# Patient Record
Sex: FEMALE | Race: WHITE | NOT HISPANIC OR LATINO | Employment: FULL TIME | URBAN - METROPOLITAN AREA
[De-identification: names, ages, dates, MRNs, and addresses within clinical notes are randomized per-mention and may not be internally consistent; named-entity substitution may affect disease eponyms.]

---

## 2017-01-04 ENCOUNTER — ALLSCRIPTS OFFICE VISIT (OUTPATIENT)
Dept: OTHER | Facility: OTHER | Age: 52
End: 2017-01-04

## 2017-01-28 ENCOUNTER — ALLSCRIPTS OFFICE VISIT (OUTPATIENT)
Dept: OTHER | Facility: OTHER | Age: 52
End: 2017-01-28

## 2017-02-23 ENCOUNTER — ALLSCRIPTS OFFICE VISIT (OUTPATIENT)
Dept: OTHER | Facility: OTHER | Age: 52
End: 2017-02-23

## 2017-04-24 ENCOUNTER — ALLSCRIPTS OFFICE VISIT (OUTPATIENT)
Dept: OTHER | Facility: OTHER | Age: 52
End: 2017-04-24

## 2017-05-10 ENCOUNTER — ALLSCRIPTS OFFICE VISIT (OUTPATIENT)
Dept: OTHER | Facility: OTHER | Age: 52
End: 2017-05-10

## 2018-01-12 VITALS
HEIGHT: 63 IN | WEIGHT: 163 LBS | BODY MASS INDEX: 28.88 KG/M2 | HEART RATE: 74 BPM | RESPIRATION RATE: 14 BRPM | SYSTOLIC BLOOD PRESSURE: 138 MMHG | DIASTOLIC BLOOD PRESSURE: 80 MMHG | TEMPERATURE: 98.2 F

## 2018-01-14 VITALS
BODY MASS INDEX: 29.77 KG/M2 | HEART RATE: 78 BPM | RESPIRATION RATE: 16 BRPM | SYSTOLIC BLOOD PRESSURE: 118 MMHG | WEIGHT: 168 LBS | HEIGHT: 63 IN | DIASTOLIC BLOOD PRESSURE: 80 MMHG

## 2018-01-14 VITALS
TEMPERATURE: 97.6 F | BODY MASS INDEX: 29.52 KG/M2 | RESPIRATION RATE: 16 BRPM | WEIGHT: 164 LBS | DIASTOLIC BLOOD PRESSURE: 70 MMHG | SYSTOLIC BLOOD PRESSURE: 110 MMHG | HEART RATE: 76 BPM

## 2018-01-14 VITALS
HEART RATE: 82 BPM | BODY MASS INDEX: 28.7 KG/M2 | RESPIRATION RATE: 16 BRPM | SYSTOLIC BLOOD PRESSURE: 120 MMHG | TEMPERATURE: 97.6 F | HEIGHT: 63 IN | WEIGHT: 162 LBS | DIASTOLIC BLOOD PRESSURE: 72 MMHG

## 2018-01-14 VITALS
RESPIRATION RATE: 20 BRPM | WEIGHT: 162 LBS | BODY MASS INDEX: 28.7 KG/M2 | HEIGHT: 63 IN | HEART RATE: 72 BPM | DIASTOLIC BLOOD PRESSURE: 70 MMHG | SYSTOLIC BLOOD PRESSURE: 104 MMHG | TEMPERATURE: 97.3 F

## 2019-07-09 ENCOUNTER — TELEPHONE (OUTPATIENT)
Dept: FAMILY MEDICINE CLINIC | Facility: CLINIC | Age: 54
End: 2019-07-09

## 2020-02-03 ENCOUNTER — TELEPHONE (OUTPATIENT)
Dept: FAMILY MEDICINE CLINIC | Facility: CLINIC | Age: 55
End: 2020-02-03

## 2020-02-03 NOTE — TELEPHONE ENCOUNTER
----- Message from Jann Bowie sent at 2/3/2020  8:14 AM EST -----      ----- Message -----  From: Edwin Bell  Sent: 2/3/2020   8:14 AM EST  To: [facility]

## 2020-10-28 ENCOUNTER — TELEMEDICINE (OUTPATIENT)
Dept: FAMILY MEDICINE CLINIC | Facility: CLINIC | Age: 55
End: 2020-10-28
Payer: COMMERCIAL

## 2020-10-28 VITALS — BODY MASS INDEX: 25.16 KG/M2 | HEIGHT: 63 IN | WEIGHT: 142 LBS

## 2020-10-28 DIAGNOSIS — Z12.31 ENCOUNTER FOR SCREENING MAMMOGRAM FOR MALIGNANT NEOPLASM OF BREAST: ICD-10-CM

## 2020-10-28 DIAGNOSIS — J01.90 ACUTE SINUSITIS, RECURRENCE NOT SPECIFIED, UNSPECIFIED LOCATION: ICD-10-CM

## 2020-10-28 DIAGNOSIS — Z12.11 SCREENING FOR COLON CANCER: ICD-10-CM

## 2020-10-28 PROCEDURE — 99213 OFFICE O/P EST LOW 20 MIN: CPT | Performed by: FAMILY MEDICINE

## 2020-10-28 RX ORDER — ALBUTEROL SULFATE 90 UG/1
1 AEROSOL, METERED RESPIRATORY (INHALATION) EVERY 4 HOURS PRN
COMMUNITY
Start: 2017-05-10 | End: 2020-10-28 | Stop reason: SDUPTHER

## 2020-10-28 RX ORDER — FEXOFENADINE HCL 180 MG/1
180 TABLET ORAL DAILY
COMMUNITY

## 2020-10-28 RX ORDER — AZITHROMYCIN 250 MG/1
TABLET, FILM COATED ORAL
Qty: 6 TABLET | Refills: 0 | Status: SHIPPED | OUTPATIENT
Start: 2020-10-28 | End: 2020-11-02

## 2020-10-28 RX ORDER — FAMOTIDINE, CALCIUM CARBONATE, AND MAGNESIUM HYDROXIDE 10; 800; 165 MG/1; MG/1; MG/1
TABLET, CHEWABLE ORAL
COMMUNITY

## 2020-10-28 RX ORDER — ALBUTEROL SULFATE 90 UG/1
1 AEROSOL, METERED RESPIRATORY (INHALATION) EVERY 4 HOURS PRN
Qty: 18 G | Refills: 1 | Status: SHIPPED | OUTPATIENT
Start: 2020-10-28 | End: 2022-01-28

## 2020-11-16 ENCOUNTER — TELEMEDICINE (OUTPATIENT)
Dept: FAMILY MEDICINE CLINIC | Facility: CLINIC | Age: 55
End: 2020-11-16
Payer: COMMERCIAL

## 2020-11-16 VITALS — HEIGHT: 63 IN | TEMPERATURE: 100.4 F | BODY MASS INDEX: 25.16 KG/M2 | WEIGHT: 142 LBS

## 2020-11-16 DIAGNOSIS — B34.9 VIRAL INFECTION, UNSPECIFIED: ICD-10-CM

## 2020-11-16 DIAGNOSIS — J06.9 ACUTE URI: Primary | ICD-10-CM

## 2020-11-16 PROCEDURE — 99213 OFFICE O/P EST LOW 20 MIN: CPT | Performed by: FAMILY MEDICINE

## 2020-11-16 PROCEDURE — 4004F PT TOBACCO SCREEN RCVD TLK: CPT | Performed by: FAMILY MEDICINE

## 2020-11-16 PROCEDURE — 87637 SARSCOV2&INF A&B&RSV AMP PRB: CPT | Performed by: FAMILY MEDICINE

## 2020-11-16 PROCEDURE — 3008F BODY MASS INDEX DOCD: CPT | Performed by: FAMILY MEDICINE

## 2020-11-16 PROCEDURE — 3725F SCREEN DEPRESSION PERFORMED: CPT | Performed by: FAMILY MEDICINE

## 2020-11-16 RX ORDER — AZITHROMYCIN 250 MG/1
TABLET, FILM COATED ORAL
Qty: 6 TABLET | Refills: 0 | Status: SHIPPED | OUTPATIENT
Start: 2020-11-16 | End: 2020-11-21

## 2020-11-19 ENCOUNTER — TELEPHONE (OUTPATIENT)
Dept: FAMILY MEDICINE CLINIC | Facility: CLINIC | Age: 55
End: 2020-11-19

## 2020-11-20 ENCOUNTER — TELEPHONE (OUTPATIENT)
Dept: FAMILY MEDICINE CLINIC | Facility: CLINIC | Age: 55
End: 2020-11-20

## 2020-11-20 LAB
FLUAV RNA NPH QL NAA+PROBE: NOT DETECTED
FLUBV RNA NPH QL NAA+PROBE: NOT DETECTED
RSV RNA NPH QL NAA+PROBE: NOT DETECTED
SARS-COV-2 RNA NPH QL NAA+PROBE: NOT DETECTED

## 2020-11-30 ENCOUNTER — TELEPHONE (OUTPATIENT)
Dept: GASTROENTEROLOGY | Facility: CLINIC | Age: 55
End: 2020-11-30

## 2021-03-30 ENCOUNTER — TELEPHONE (OUTPATIENT)
Dept: FAMILY MEDICINE CLINIC | Facility: CLINIC | Age: 56
End: 2021-03-30

## 2021-03-30 ENCOUNTER — TELEMEDICINE (OUTPATIENT)
Dept: FAMILY MEDICINE CLINIC | Facility: CLINIC | Age: 56
End: 2021-03-30

## 2021-03-30 DIAGNOSIS — J01.90 ACUTE SINUSITIS, RECURRENCE NOT SPECIFIED, UNSPECIFIED LOCATION: Primary | ICD-10-CM

## 2021-03-30 PROCEDURE — 99213 OFFICE O/P EST LOW 20 MIN: CPT | Performed by: NURSE PRACTITIONER

## 2021-03-30 RX ORDER — AZITHROMYCIN 250 MG/1
TABLET, FILM COATED ORAL
Qty: 6 TABLET | Refills: 0 | Status: SHIPPED | OUTPATIENT
Start: 2021-03-30 | End: 2021-04-04

## 2021-03-30 NOTE — PROGRESS NOTES
Virtual Regular Visit      Assessment/Plan:    Problem List Items Addressed This Visit     None      Visit Diagnoses     Acute sinusitis, recurrence not specified, unspecified location    -  Primary    Relevant Medications    azithromycin (ZITHROMAX) 250 mg tablet        Take medication with food  It is important that you take the entire course of antibiotics prescribed  May also take a probiotic of your choice to maintain healthy GI yesenia  Can take some probiotic and yogurt with the medication  Supportive care discussed and advised  Advised to RTO for any worsening and no improvement  Follow up for no improvement and worsening of conditions  Patient advised and educated when to see immediate medical care  Reason for visit is   Chief Complaint   Patient presents with    Virtual Regular Visit        Encounter provider REGI Jones    Provider located at [address] [address]      Recent Visits  No visits were found meeting these conditions  Showing recent visits within past 7 days and meeting all other requirements     Today's Visits  Date Type Provider Dept   03/30/21 Telemedicine [place] today's visits and meeting all other requirements     Future Appointments  No visits were found meeting these conditions  Showing future appointments within next 150 days and meeting all other requirements        The patient was identified by name and date of birth  Roni Savage was informed that this is a telemedicine visit and that the visit is being conducted through [facility] and patient was informed that this is a secure, HIPAA-compliant platform  She agrees to proceed     My office door was closed  No one else was in the room  She acknowledged consent and understanding of privacy and security of the video platform   The patient has agreed to participate and understands they can discontinue the visit at any time  Patient is aware this is a billable service  Subjective  Jay Jay Lorenz is a 63 y o  female    HPI   Patient stated that started with congestion, sneezing and bodyaches on 03/26/2021 and got tested at [facility] for COVID-19 which came back negative and got little better but now today symptoms got worse with ear pain, sinus pressure and has temp of 100 2  Denies any chills, sob, cough,  fatigue, headache, new loss of sense of smell and taste, sore throat,  nausea, vomiting and diarrhea  Past Medical History:   Diagnosis Date    Allergic rhinitis     Resolved 2/23/2017     Chronic sinusitis     Resolved 1/4/2017     GERD (gastroesophageal reflux disease)     Mitral valve prolapse     Palpitations     Last assessed 9/13/2006     Skin disorder     Resolved 1/4/2017     Skin lesions     Resolved 1/4/2017        Past Surgical History:   Procedure Laterality Date    ENDOMETRIAL ABLATION      LIPOMA RESECTION      arm    TUBAL LIGATION         Current Outpatient Medications   Medication Sig Dispense Refill    albuterol (ProAir HFA) 90 mcg/act inhaler Inhale 1 puff every 4 (four) hours as needed for wheezing or shortness of breath 18 g 1    azithromycin (ZITHROMAX) 250 mg tablet Take 500mg on day 1, 250mg on days 2-5 6 tablet 0    famotidine-calcium carbonate-magnesium hydroxide (Pepcid Complete) -165 MG CHEW Chew      fexofenadine (ALLEGRA) 180 MG tablet Take 180 mg by mouth daily       No current facility-administered medications for this visit  Allergies   Allergen Reactions    Diphenhydramine      Reaction Date: 18Apr2005;     Penicillins      Reaction Date: 18Apr2005; Review of Systems   Constitutional: Positive for fever (100 2)  Negative for chills, diaphoresis, fatigue and unexpected weight change  HENT: Positive for congestion, ear pain, sinus pressure and sneezing   Negative for dental problem, drooling, ear discharge, facial swelling, hearing loss, mouth sores, nosebleeds, postnasal drip, rhinorrhea, sinus pain, sore throat, tinnitus, trouble swallowing and voice change  Respiratory: Negative for cough, chest tightness, shortness of breath and wheezing  Cardiovascular: Negative  Gastrointestinal: Negative for abdominal pain, constipation, diarrhea, nausea and vomiting  Musculoskeletal: Positive for myalgias  Skin: Negative  Neurological: Negative for dizziness, light-headedness and headaches  Video Exam    There were no vitals filed for this visit  Physical Exam  Constitutional:       Appearance: She is well-developed  HENT:      Nose: Nose normal    Eyes:      Conjunctiva/sclera: Conjunctivae normal    Neck:      Musculoskeletal: Normal range of motion  Pulmonary:      Effort: Pulmonary effort is normal       Comments: No cough and distress noted on video call  Skin:     Comments: No diaphoresis noted on video call   Neurological:      Mental Status: She is alert and oriented to person, place, and time  Psychiatric:         Mood and Affect: Mood normal          Behavior: Behavior normal          Thought Content: Thought content normal          Judgment: Judgment normal           I spent 7 minutes directly with the patient during this visit      VIRTUAL VISIT DISCLAIMER    Theresa Toro acknowledges that she has consented to an online visit or consultation  She understands that the online visit is based solely on information provided by her, and that, in the absence of a face-to-face physical evaluation by the physician, the diagnosis she receives is both limited and provisional in terms of accuracy and completeness  This is not intended to replace a full medical face-to-face evaluation by the physician  Theresa Toro understands and accepts these terms

## 2021-03-30 NOTE — LETTER
March 30, 2021     Patient: Jaylin Hua   YOB: 1965   Date of Visit: 3/30/2021       To Whom it May Concern:    Jaylin Hua is under my professional care  She was seen in my office on 3/30/2021  Please excuse from work on 03/30/2021 and 03/31/2021  If you have any questions or concerns, please don't hesitate to call  Sincerely,        REGI Adrian            CC:  No Recipients

## 2022-01-20 ENCOUNTER — HOSPITAL ENCOUNTER (EMERGENCY)
Facility: HOSPITAL | Age: 57
Discharge: HOME | End: 2022-01-21
Attending: EMERGENCY MEDICINE | Admitting: EMERGENCY MEDICINE
Payer: COMMERCIAL

## 2022-01-20 DIAGNOSIS — R07.81 RIB PAIN ON RIGHT SIDE: Primary | ICD-10-CM

## 2022-01-20 DIAGNOSIS — J18.9 PNEUMONITIS: ICD-10-CM

## 2022-01-20 PROCEDURE — 99284 EMERGENCY DEPT VISIT MOD MDM: CPT

## 2022-01-21 ENCOUNTER — APPOINTMENT (EMERGENCY)
Dept: RADIOLOGY | Facility: HOSPITAL | Age: 57
End: 2022-01-21
Payer: COMMERCIAL

## 2022-01-21 VITALS
BODY MASS INDEX: 30.11 KG/M2 | DIASTOLIC BLOOD PRESSURE: 70 MMHG | OXYGEN SATURATION: 98 % | SYSTOLIC BLOOD PRESSURE: 140 MMHG | RESPIRATION RATE: 18 BRPM | TEMPERATURE: 98.7 F | WEIGHT: 170 LBS | HEART RATE: 62 BPM

## 2022-01-21 LAB
ALBUMIN SERPL BCP-MCNC: 3.3 G/DL (ref 3.5–5)
ALP SERPL-CCNC: 80 U/L (ref 46–116)
ALT SERPL W P-5'-P-CCNC: 28 U/L (ref 12–78)
ANION GAP SERPL CALCULATED.3IONS-SCNC: 12 MMOL/L (ref 4–13)
AST SERPL W P-5'-P-CCNC: 12 U/L (ref 5–45)
ATRIAL RATE: 67 BPM
BASOPHILS # BLD AUTO: 0.05 THOUSANDS/ΜL (ref 0–0.1)
BASOPHILS NFR BLD AUTO: 1 % (ref 0–1)
BILIRUB SERPL-MCNC: 0.25 MG/DL (ref 0.2–1)
BUN SERPL-MCNC: 17 MG/DL (ref 5–25)
CALCIUM ALBUM COR SERPL-MCNC: 9.9 MG/DL (ref 8.3–10.1)
CALCIUM SERPL-MCNC: 9.3 MG/DL (ref 8.3–10.1)
CARDIAC TROPONIN I PNL SERPL HS: 3 NG/L
CHLORIDE SERPL-SCNC: 100 MMOL/L (ref 100–108)
CO2 SERPL-SCNC: 26 MMOL/L (ref 21–32)
CREAT SERPL-MCNC: 0.89 MG/DL (ref 0.6–1.3)
D DIMER PPP FEU-MCNC: 0.33 UG/ML FEU
EOSINOPHIL # BLD AUTO: 0.21 THOUSAND/ΜL (ref 0–0.61)
EOSINOPHIL NFR BLD AUTO: 3 % (ref 0–6)
ERYTHROCYTE [DISTWIDTH] IN BLOOD BY AUTOMATED COUNT: 13.2 % (ref 11.6–15.1)
GFR SERPL CREATININE-BSD FRML MDRD: 72 ML/MIN/1.73SQ M
GLUCOSE SERPL-MCNC: 115 MG/DL (ref 65–140)
HCT VFR BLD AUTO: 40.5 % (ref 34.8–46.1)
HGB BLD-MCNC: 12.7 G/DL (ref 11.5–15.4)
IMM GRANULOCYTES # BLD AUTO: 0.04 THOUSAND/UL (ref 0–0.2)
IMM GRANULOCYTES NFR BLD AUTO: 1 % (ref 0–2)
LYMPHOCYTES # BLD AUTO: 2.73 THOUSANDS/ΜL (ref 0.6–4.47)
LYMPHOCYTES NFR BLD AUTO: 35 % (ref 14–44)
MCH RBC QN AUTO: 28.9 PG (ref 26.8–34.3)
MCHC RBC AUTO-ENTMCNC: 31.4 G/DL (ref 31.4–37.4)
MCV RBC AUTO: 92 FL (ref 82–98)
MONOCYTES # BLD AUTO: 0.79 THOUSAND/ΜL (ref 0.17–1.22)
MONOCYTES NFR BLD AUTO: 10 % (ref 4–12)
NEUTROPHILS # BLD AUTO: 3.92 THOUSANDS/ΜL (ref 1.85–7.62)
NEUTS SEG NFR BLD AUTO: 50 % (ref 43–75)
NRBC BLD AUTO-RTO: 0 /100 WBCS
P AXIS: 69 DEGREES
PLATELET # BLD AUTO: 219 THOUSANDS/UL (ref 149–390)
PMV BLD AUTO: 10.7 FL (ref 8.9–12.7)
POTASSIUM SERPL-SCNC: 3.8 MMOL/L (ref 3.5–5.3)
PR INTERVAL: 190 MS
PROT SERPL-MCNC: 6.8 G/DL (ref 6.4–8.2)
QRS AXIS: 6 DEGREES
QRSD INTERVAL: 84 MS
QT INTERVAL: 374 MS
QTC INTERVAL: 395 MS
RBC # BLD AUTO: 4.39 MILLION/UL (ref 3.81–5.12)
SODIUM SERPL-SCNC: 138 MMOL/L (ref 136–145)
T WAVE AXIS: 71 DEGREES
VENTRICULAR RATE: 67 BPM
WBC # BLD AUTO: 7.74 THOUSAND/UL (ref 4.31–10.16)

## 2022-01-21 PROCEDURE — 85379 FIBRIN DEGRADATION QUANT: CPT | Performed by: EMERGENCY MEDICINE

## 2022-01-21 PROCEDURE — 36415 COLL VENOUS BLD VENIPUNCTURE: CPT | Performed by: EMERGENCY MEDICINE

## 2022-01-21 PROCEDURE — 71045 X-RAY EXAM CHEST 1 VIEW: CPT

## 2022-01-21 PROCEDURE — 84484 ASSAY OF TROPONIN QUANT: CPT | Performed by: EMERGENCY MEDICINE

## 2022-01-21 PROCEDURE — 93005 ELECTROCARDIOGRAM TRACING: CPT

## 2022-01-21 PROCEDURE — 85025 COMPLETE CBC W/AUTO DIFF WBC: CPT | Performed by: EMERGENCY MEDICINE

## 2022-01-21 PROCEDURE — 93010 ELECTROCARDIOGRAM REPORT: CPT | Performed by: INTERNAL MEDICINE

## 2022-01-21 PROCEDURE — 96374 THER/PROPH/DIAG INJ IV PUSH: CPT

## 2022-01-21 PROCEDURE — 80053 COMPREHEN METABOLIC PANEL: CPT | Performed by: EMERGENCY MEDICINE

## 2022-01-21 PROCEDURE — 99285 EMERGENCY DEPT VISIT HI MDM: CPT | Performed by: EMERGENCY MEDICINE

## 2022-01-21 RX ORDER — NAPROXEN 500 MG/1
500 TABLET ORAL 2 TIMES DAILY WITH MEALS
Qty: 30 TABLET | Refills: 0 | Status: SHIPPED | OUTPATIENT
Start: 2022-01-21 | End: 2022-07-05

## 2022-01-21 RX ORDER — LIDOCAINE 50 MG/G
1 PATCH TOPICAL DAILY
Qty: 5 PATCH | Refills: 0 | Status: SHIPPED | OUTPATIENT
Start: 2022-01-21 | End: 2022-02-16 | Stop reason: ALTCHOICE

## 2022-01-21 RX ORDER — KETOROLAC TROMETHAMINE 30 MG/ML
15 INJECTION, SOLUTION INTRAMUSCULAR; INTRAVENOUS ONCE
Status: COMPLETED | OUTPATIENT
Start: 2022-01-21 | End: 2022-01-21

## 2022-01-21 RX ORDER — CYCLOBENZAPRINE HCL 10 MG
10 TABLET ORAL 2 TIMES DAILY PRN
Qty: 20 TABLET | Refills: 0 | Status: SHIPPED | OUTPATIENT
Start: 2022-01-21 | End: 2022-07-05

## 2022-01-21 RX ORDER — DIAZEPAM 5 MG/1
5 TABLET ORAL ONCE
Status: COMPLETED | OUTPATIENT
Start: 2022-01-21 | End: 2022-01-21

## 2022-01-21 RX ORDER — LIDOCAINE 50 MG/G
1 PATCH TOPICAL ONCE
Status: DISCONTINUED | OUTPATIENT
Start: 2022-01-21 | End: 2022-01-21 | Stop reason: HOSPADM

## 2022-01-21 RX ORDER — ACETAMINOPHEN 325 MG/1
650 TABLET ORAL ONCE
Status: COMPLETED | OUTPATIENT
Start: 2022-01-21 | End: 2022-01-21

## 2022-01-21 RX ADMIN — ACETAMINOPHEN 650 MG: 325 TABLET, FILM COATED ORAL at 01:05

## 2022-01-21 RX ADMIN — KETOROLAC TROMETHAMINE 15 MG: 30 INJECTION, SOLUTION INTRAMUSCULAR at 01:05

## 2022-01-21 RX ADMIN — DIAZEPAM 5 MG: 5 TABLET ORAL at 01:05

## 2022-01-21 RX ADMIN — LIDOCAINE 5% 1 PATCH: 700 PATCH TOPICAL at 01:04

## 2022-01-21 NOTE — ED PROVIDER NOTES
Final Diagnosis:  1  Rib pain on right side    2  Pneumonitis        Chief Complaint   Patient presents with    Rib Pain     R rib pain started today and worsening, Denies any SOB, nausea, no injury to area  HPI  Patient presents with right chest wall pain and dyspnea  She has been more active lately so conceivable msk  Minimal pain at rest  No prior clots  No prior cardiac  No recent travel stasis cancer  Symptoms today  Not entirely reproducible with palpation  Not diaphoretic  No assoc n/v/radiation/diaphoresis  No productive cough, all dry  Comfortable breathing on RA>     H/o  MVP, emphysema (empyema false entry), 10d out from covid, no fevers, coughing mucous, dry cough  - No language barrier    - History obtained from patient  - There are no limitations to the history obtained  - Previous charting underwent limited review with attention to last ED visits, labs, ekgs, and prior imaging  PMH:   has a past medical history of Allergic rhinitis, Chronic sinusitis, COVID-19 (01/10/2022), Empyema ([address] ), GERD (gastroesophageal reflux disease), Mitral valve prolapse, Palpitations, Skin disorder, and Skin lesions  PSH:   has a past surgical history that includes Endometrial ablation; Tubal ligation; and Lipoma resection  Social History:  No smoking    ROS:  Review of Systems   Constitutional: Negative for chills and fever  HENT: Negative for ear pain and sore throat  Eyes: Negative for pain and visual disturbance  Respiratory: Positive for cough and chest tightness  Negative for shortness of breath  Cardiovascular: Positive for chest pain  Negative for palpitations  Gastrointestinal: Negative for abdominal pain and vomiting  Genitourinary: Negative for dysuria and hematuria  Musculoskeletal: Negative for arthralgias and back pain  Skin: Negative for color change and rash  Neurological: Negative for seizures and syncope  All other systems reviewed and are negative         PE: Physical exam highlights:   Physical Exam       Vitals:    01/20/22 2313 01/21/22 0115   BP: 144/72 140/70   BP Location: Right arm Right arm   Pulse: 81 62   Resp: 18 18   Temp: 98 7 °F (37 1 °C)    TempSrc: Tympanic    SpO2: 96% 98%   Weight: 77 1 kg (170 lb)      Vitals reviewed by me  Nursing note reviewed  Chaperone present for all sensitive exam   Const: No acute distress  Alert  Nontoxic  Not diaphoretic  HEENT: External ears normal  No protrusion drainage swelling  Nose normal  No drainage/traumatic deformity  MMM  Mouth with baseline/symmetric movement  No trismus  Eyes: No squinting  No icterus  Tracks through the room with normal EOM  No tearing/swelling/drainage  Neck: ROM normal  No rigidity  No meningismus  Cards: Rate as per vitals  Compared to monitor sinus unless documented above  Regular  Well perfused  Pulm: able to verbalize without additional effort  Effort and excursion normal  No disress  No audible wheezing/ stridor  Normal resp rate  Abd: No distension beyond baseline  No fluctuant wave  Patient without peritoneal pain with shifting/bumping the bed  MSK: ROM normal and baseline  No deformity  Skin: No new rashes visible  Well perfused  Neuro: Nonfocal  Baseline  CN grossly intact  Moving all four with coordination  Psych: Normal behavior and affect  A:  - Nursing note reviewed      Ddx and MDM  PE -dimer  Single trop ACS (greater than 3 hour)    EKG    Pneumonitis  pneumo thorax - xr  Pneumonia post covid - xr, lab        HEART Risk Score      Most Recent Value   Heart Score Risk Calculator    History 0 Filed at: 01/23/2022 0003   ECG 1 Filed at: 01/23/2022 0003   Age 1 Filed at: 01/23/2022 0003   Risk Factors 1 Filed at: 01/23/2022 0003   Troponin 0 Filed at: 01/23/2022 0003   HEART Score 3 Filed at: 01/23/2022 0003                     ED Course as of 01/23/22 0003   Fri Jan 21, 2022   0200 Procedure Note: EKG  Date/Time: 01/21/22 2:00 AM   Interpreted by: Jose Beasley Baby Smolder  Indications / Diagnosis: CP  ECG reviewed by me, the ED Provider: yes   The EKG demonstrates:  Rhythm: sinus    Intervals: normal intervals  Axis: normal axis  QRS/Blocks: normal QRS  ST Changes: No acute ST Changes, no STD/SHIRLEY  T wave changes: none  Nonspefic s1q3t(flat)3       XR chest 1 view portable   Final Result      No acute cardiopulmonary disease  Workstation performed: [id number]           Orders Placed This Encounter   Procedures    XR chest 1 view portable    Comprehensive metabolic panel    HS Troponin 0hr (reflex protocol)    CBC and differential    D-dimer, quantitative    EKG RESULTS    ECG 12 lead    ECG 12 lead     Labs Reviewed   COMPREHENSIVE METABOLIC PANEL - Abnormal       Result Value Ref Range Status    Sodium 138  136 - 145 mmol/L Final    Potassium 3 8  3 5 - 5 3 mmol/L Final    Chloride 100  100 - 108 mmol/L Final    CO2 26  21 - 32 mmol/L Final    ANION GAP 12  4 - 13 mmol/L Final    BUN 17  5 - 25 mg/dL Final    Creatinine 0 89  0 60 - 1 30 mg/dL Final    Comment: Standardized to IDMS reference method    Glucose 115  65 - 140 mg/dL Final    Comment: If the patient is fasting, the ADA then defines impaired fasting glucose as > 100 mg/dL and diabetes as > or equal to 123 mg/dL  Specimen collection should occur prior to Sulfasalazine administration due to the potential for falsely depressed results  Specimen collection should occur prior to Sulfapyridine administration due to the potential for falsely elevated results  Calcium 9 3  8 3 - 10 1 mg/dL Final    Corrected Calcium 9 9  8 3 - 10 1 mg/dL Final    AST 12  5 - 45 U/L Final    Comment: Specimen collection should occur prior to Sulfasalazine administration due to the potential for falsely depressed results  ALT 28  12 - 78 U/L Final    Comment: Specimen collection should occur prior to Sulfasalazine administration due to the potential for falsely depressed results       Alkaline Phosphatase 80 46 - 116 U/L Final    Total Protein 6 8  6 4 - 8 2 g/dL Final    Albumin 3 3 (*) 3 5 - 5 0 g/dL Final    Total Bilirubin 0 25  0 20 - 1 00 mg/dL Final    Comment: Use of this assay is not recommended for patients undergoing treatment with eltrombopag due to the potential for falsely elevated results  eGFR 72  ml/min/1 73sq m Final    Narrative:     [place] guidelines for Chronic Kidney Disease (CKD):     Stage 1 with normal or high GFR (GFR > 90 mL/min/1 73 square meters)    Stage 2 Mild CKD (GFR = 60-89 mL/min/1 73 square meters)    Stage 3A Moderate CKD (GFR = 45-59 mL/min/1 73 square meters)    Stage 3B Moderate CKD (GFR = 30-44 mL/min/1 73 square meters)    Stage 4 Severe CKD (GFR = 15-29 mL/min/1 73 square meters)    Stage 5 End Stage CKD (GFR <15 mL/min/1 73 square meters)  Note: GFR calculation is accurate only with a steady state creatinine   D-DIMER, QUANTITATIVE - Normal    D-Dimer, Quant 0 33  <0 50 ug/ml FEU Final    Comment: Reference and upper limits to exclude DVT and PE are the same  Do not use to exclude if clinical symptoms are present  Pregnant women:  1st trimester:  <0 22 - 1 06 ug/ml FEU  2nd trimester:  <0 22 - 1 88 ug/ml FEU  3rd trimester:   0 24 - 3 28 ug/ml FEU    Note: Normal ranges may not apply to patients who are transgender, non-binary, or whose legal sex, sex at birth, and gender identity differ  HS TROPONIN I 0HR    hs TnI 0hr 3  "Refer to ACS Flowchart"- see link ng/L Final    Comment:                                              Initial (time 0) result  If >=50 ng/L, Myocardial injury suggested ;  Type of myocardial injury and treatment strategy  to be determined  If 5-49 ng/L, a delta result at 2 hours and or 4 hours will be needed to further evaluate  If <4 ng/L, and chest pain has been >3 hours since onset, patient may qualify for discharge based on the HEART score in the ED    If <5 ng/L and <3hours since onset of chest pain, a delta result at 2 hours will be needed to further evaluate  Second Troponin (time 2 hours)  If calculated delta >= 20 ng/L,  Myocardial injury suggested ; Type of myocardial injury and treatment strategy to be determined  If 5-49 ng/L and the calculated delta is 5-19 ng/L, consult medical service for evaluation  Continue evaluation for ischemia on ecg and other possible etiology and repeat hs troponin at 4 hours  If delta is <5 ng/L at 2 hours, consider discharge based on risk stratification via the HEART score (if in ED), or GIANA risk score in IP/Observation  CBC AND DIFFERENTIAL    WBC 7 74  4 31 - 10 16 Thousand/uL Final    RBC 4 39  3 81 - 5 12 Million/uL Final    Hemoglobin 12 7  11 5 - 15 4 g/dL Final    Hematocrit 40 5  34 8 - 46 1 % Final    MCV 92  82 - 98 fL Final    MCH 28 9  26 8 - 34 3 pg Final    MCHC 31 4  31 4 - 37 4 g/dL Final    RDW 13 2  11 6 - 15 1 % Final    MPV 10 7  8 9 - 12 7 fL Final    Platelets 949  552 - 390 Thousands/uL Final    nRBC 0  /100 WBCs Final    Neutrophils Relative 50  43 - 75 % Final    Immat GRANS % 1  0 - 2 % Final    Lymphocytes Relative 35  14 - 44 % Final    Monocytes Relative 10  4 - 12 % Final    Eosinophils Relative 3  0 - 6 % Final    Basophils Relative 1  0 - 1 % Final    Neutrophils Absolute 3 92  1 85 - 7 62 Thousands/µL Final    Immature Grans Absolute 0 04  0 00 - 0 20 Thousand/uL Final    Lymphocytes Absolute 2 73  0 60 - 4 47 Thousands/µL Final    Monocytes Absolute 0 79  0 17 - 1 22 Thousand/µL Final    Eosinophils Absolute 0 21  0 00 - 0 61 Thousand/µL Final    Basophils Absolute 0 05  0 00 - 0 10 Thousands/µL Final       Final Diagnosis:  1  Rib pain on right side    2   Pneumonitis        P:  - hospital tx includes   Medications   diazepam (VALIUM) tablet 5 mg (5 mg Oral Given 1/21/22 0105)   acetaminophen (TYLENOL) tablet 650 mg (650 mg Oral Given 1/21/22 0105)   ketorolac (TORADOL) injection 15 mg (15 mg Intravenous Given 1/21/22 0105)         - disposition  Time reflects when diagnosis was documented in both MDM as applicable and the Disposition within this note     Time User Action Codes Description Comment    1/21/2022  2:25 AM Farhan Pool Add [R07 81] Rib pain on right side     1/21/2022  2:25 AM Farhan Graves Add [J18 9] Pneumonitis       ED Disposition     ED Disposition Condition Date/Time Comment    Discharge Stable Fri Jan 21, 2022  2:22 AM Hayden Zaidi discharge to home/self care  Follow-up Information     Follow up With Specialties Details Why Contact Info    [address] [facility], [address], Nurse Practitioner Schedule an appointment as soon as possible for a visit   [address]  [phone]            - patient will call their PCP to let them know they were in the emergency department   We discuss return precautions       - additional tx intended, if consistent with primary provider:  - patient to follow with :      Discharge Medication List as of 1/21/2022  2:28 AM      START taking these medications    Details   cyclobenzaprine (FLEXERIL) 10 mg tablet Take 1 tablet (10 mg total) by mouth 2 (two) times a day as needed for muscle spasms, Starting Fri 1/21/2022, Normal      lidocaine (Lidoderm) 5 % Apply 1 patch topically daily Remove & Discard patch within 12 hours or as directed by MD, Starting Fri 1/21/2022, Normal      naproxen (Naprosyn) 500 mg tablet Take 1 tablet (500 mg total) by mouth 2 (two) times a day with meals, Starting Fri 1/21/2022, Normal         CONTINUE these medications which have NOT CHANGED    Details   albuterol (ProAir HFA) 90 mcg/act inhaler Inhale 1 puff every 4 (four) hours as needed for wheezing or shortness of breath, Starting Wed 10/28/2020, Normal      famotidine-calcium carbonate-magnesium hydroxide (Pepcid Complete) -165 MG CHEW Chew, Historical Med      fexofenadine (ALLEGRA) 180 MG tablet Take 180 mg by mouth daily, Historical Med No discharge procedures on file  Prior to Admission Medications   Prescriptions Last Dose Informant Patient Reported? Taking? albuterol (ProAir HFA) 90 mcg/act inhaler   No No   Sig: Inhale 1 puff every 4 (four) hours as needed for wheezing or shortness of breath   famotidine-calcium carbonate-magnesium hydroxide (Pepcid Complete) -165 MG CHEW   Yes No   Sig: Chew   fexofenadine (ALLEGRA) 180 MG tablet   Yes No   Sig: Take 180 mg by mouth daily      Facility-Administered Medications: None       Portions of the record may have been created with voice recognition software  Occasional wrong word or "sound a like" substitutions may have occurred due to the inherent limitations of voice recognition software  Read the chart carefully and recognize, using context, where substitutions have occurred      Electronically signed by:  MD Jessica Burnette MD  01/23/22 6206

## 2022-01-24 ENCOUNTER — APPOINTMENT (OUTPATIENT)
Dept: LAB | Facility: CLINIC | Age: 57
End: 2022-01-24
Payer: COMMERCIAL

## 2022-01-24 DIAGNOSIS — Z02.1 PRE-EMPLOYMENT EXAMINATION: ICD-10-CM

## 2022-01-24 LAB — RUBV IGG SERPL IA-ACNC: >175 IU/ML

## 2022-01-24 PROCEDURE — 86480 TB TEST CELL IMMUN MEASURE: CPT

## 2022-01-24 PROCEDURE — 36415 COLL VENOUS BLD VENIPUNCTURE: CPT

## 2022-01-24 PROCEDURE — 86787 VARICELLA-ZOSTER ANTIBODY: CPT

## 2022-01-24 PROCEDURE — 86735 MUMPS ANTIBODY: CPT

## 2022-01-24 PROCEDURE — 86762 RUBELLA ANTIBODY: CPT

## 2022-01-24 PROCEDURE — 86765 RUBEOLA ANTIBODY: CPT

## 2022-01-25 LAB
GAMMA INTERFERON BACKGROUND BLD IA-ACNC: 0.02 IU/ML
M TB IFN-G BLD-IMP: NEGATIVE
M TB IFN-G CD4+ BCKGRND COR BLD-ACNC: 0 IU/ML
M TB IFN-G CD4+ BCKGRND COR BLD-ACNC: 0 IU/ML
MEV IGG SER QL: NORMAL
MITOGEN IGNF BCKGRD COR BLD-ACNC: 7.91 IU/ML
MUV IGG SER QL: NORMAL
VZV IGG SER IA-ACNC: NORMAL

## 2022-01-27 DIAGNOSIS — J01.90 ACUTE SINUSITIS, RECURRENCE NOT SPECIFIED, UNSPECIFIED LOCATION: ICD-10-CM

## 2022-01-28 RX ORDER — ALBUTEROL SULFATE 90 UG/1
AEROSOL, METERED RESPIRATORY (INHALATION)
Qty: 18 G | Refills: 1 | Status: SHIPPED | OUTPATIENT
Start: 2022-01-28 | End: 2022-05-25

## 2022-01-28 NOTE — TELEPHONE ENCOUNTER
Spoke with patient, she had to switch to 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

## 2022-02-16 ENCOUNTER — OFFICE VISIT (OUTPATIENT)
Dept: URGENT CARE | Facility: CLINIC | Age: 57
End: 2022-02-16
Payer: COMMERCIAL

## 2022-02-16 VITALS — TEMPERATURE: 99.1 F | HEART RATE: 111 BPM | RESPIRATION RATE: 16 BRPM | OXYGEN SATURATION: 99 %

## 2022-02-16 DIAGNOSIS — Z20.822 PERSON UNDER INVESTIGATION FOR COVID-19: ICD-10-CM

## 2022-02-16 DIAGNOSIS — K52.9 ACUTE GASTROENTERITIS: Primary | ICD-10-CM

## 2022-02-16 PROBLEM — M89.8X1 SCAPULALGIA: Status: ACTIVE | Noted: 2017-04-24

## 2022-02-16 PROCEDURE — U0005 INFEC AGEN DETEC AMPLI PROBE: HCPCS | Performed by: FAMILY MEDICINE

## 2022-02-16 PROCEDURE — 99213 OFFICE O/P EST LOW 20 MIN: CPT | Performed by: FAMILY MEDICINE

## 2022-02-16 PROCEDURE — U0003 INFECTIOUS AGENT DETECTION BY NUCLEIC ACID (DNA OR RNA); SEVERE ACUTE RESPIRATORY SYNDROME CORONAVIRUS 2 (SARS-COV-2) (CORONAVIRUS DISEASE [COVID-19]), AMPLIFIED PROBE TECHNIQUE, MAKING USE OF HIGH THROUGHPUT TECHNOLOGIES AS DESCRIBED BY CMS-2020-01-R: HCPCS | Performed by: FAMILY MEDICINE

## 2022-02-16 RX ORDER — TIOTROPIUM BROMIDE AND OLODATEROL 3.124; 2.736 UG/1; UG/1
SPRAY, METERED RESPIRATORY (INHALATION)
COMMUNITY
Start: 2022-01-31 | End: 2022-07-14 | Stop reason: SDUPTHER

## 2022-02-16 NOTE — LETTER
[facility] [address] [id number] [phone]  Dept: [phone]    February 16, 2022    Patient: Marley Vargas  YOB: 1965    Marley Vargas was seen and evaluated at our [facility]  Please note if Covid test is negative, she may return to work when fever free for 24 hours without the use of a fever reducing agent  If Covid test is positive, she may return to work on 02/22/2022  Upon return, patient must then adhere to strict masking for an additional 5 days      Sincerely,    Gianna Funes MD

## 2022-02-17 LAB — SARS-COV-2 RNA RESP QL NAA+PROBE: NEGATIVE

## 2022-02-17 NOTE — PATIENT INSTRUCTIONS
Patient tested for COVID-19 in office today  Results may be accessed via Dw[facility]uch[facility]  Patient was informed that she must remain at home on self isolation until test results return  If COVID-19 test result is positive, patient must continue to isolate for 5 days from date of symptom onset, and continue to wear a mask around others for an additional 5 days after that  Patient was informed that she must be fever free for at least 24 hours without medications prior to discontinuing isolation  Patient has been instructed to rest at home, drink plenty of fluids, take Tylenol or Motrin as needed, clear liquid or BRAT diet depending on the predominant symptom, and slowly advance as tolerated  If symptoms persist despite treatment, worsen, or any new symptoms present including but not limited to, chest pain or shortness of breath, patient is to be seen in the ER  Patient verbalizes understanding and agrees w/ treatment plan

## 2022-02-17 NOTE — PROGRESS NOTES
[facility] [facility]        NAME: Estelita Roque is a 61 y o  female  : 1965    MRN: [id number]  DATE: 2022  TIME: 7:35 PM    Assessment and Plan   Acute gastroenteritis [K52 9]  1  Acute gastroenteritis  COVID Only -Office Collect   2  Person under investigation for [address]          Patient Instructions     Patient Instructions   Patient tested for COVID-19 in office today  [facility]  Patient was informed that she must remain at home on self isolation until test results return  If COVID-19 test result is positive, patient must continue to isolate for 5 days from date of symptom onset, and continue to wear a mask around others for an additional 5 days after that  Patient was informed that she must be fever free for at least 24 hours without medications prior to discontinuing isolation  Patient has been instructed to rest at home, drink plenty of fluids, take Tylenol or Motrin as needed, clear liquid or BRAT diet depending on the predominant symptom, and slowly advance as tolerated  If symptoms persist despite treatment, worsen, or any new symptoms present including but not limited to, chest pain or shortness of breath, patient is to be seen in the ER  Patient verbalizes understanding and agrees w/ treatment plan  Follow up with PCP in 3-5 days  Proceed to  ER if symptoms worsen  Chief Complaint     Chief Complaint   Patient presents with    Diarrhea     diarrhea started this morning         History of Present Illness       60 yo female presents c/o diarrhea this morning  She states she had 4 episodes of loose stool this morning  No blood in the stool  She has associated nausea, body aches, chills, and a headache  No fever  No abdominal pain  No flank pain or UTI symptoms  No pelvic pain or GYN symptoms  No cold/URI symptoms  No chest pain, SOB, or wheezing  No loss of taste or smell   No recent travel or known exposure to anyone with COVID-19  Patient would like to be tested for COVID-19 today  She has not attempted any treatment for the symptoms at this time  Review of Systems   Review of Systems   Constitutional:        As noted in HPI   HENT: Negative  Eyes: Negative  Respiratory: Negative  Cardiovascular: Negative  Gastrointestinal:        As noted in HPI   Genitourinary: Negative  Musculoskeletal:        As noted in HPI   Skin: Negative  Allergic/Immunologic:        As noted in the chart   Neurological: Negative  Hematological: Negative            Current Medications       Current Outpatient Medications:     albuterol (PROVENTIL HFA,VENTOLIN HFA) 90 mcg/act inhaler, inhale 1 puff by mouth and INTO THE LUNGS every 4 hours  AS NEEDED FOR WHEEIZNG OR SHORTNESS OF BREATH, Disp: 18 g, Rfl: 1    cyclobenzaprine (FLEXERIL) 10 mg tablet, Take 1 tablet (10 mg total) by mouth 2 (two) times a day as needed for muscle spasms, Disp: 20 tablet, Rfl: 0    famotidine-calcium carbonate-magnesium hydroxide (Pepcid Complete) -165 MG CHEW, Chew, Disp: , Rfl:     fexofenadine (ALLEGRA) 180 MG tablet, Take 180 mg by mouth daily, Disp: , Rfl:     naproxen (Naprosyn) 500 mg tablet, Take 1 tablet (500 mg total) by mouth 2 (two) times a day with meals, Disp: 30 tablet, Rfl: 0    Stiolto Respimat 2 5-2 5 MCG/ACT inhaler, INHALE 2 PUFFS BY MOUTH JET DAY AT THE SAME TIME EACH DAY, Disp: , Rfl:     Current Allergies     Allergies as of 02/16/2022 - Reviewed 02/16/2022   Allergen Reaction Noted    Diphenhydramine  04/18/2005    Penicillins  04/18/2005            The following portions of the patient's history were reviewed and updated as appropriate: allergies, current medications, past family history, past medical history, past social history, past surgical history and problem list      Past Medical History:   Diagnosis Date    Allergic rhinitis     Resolved 2/23/2017     Chronic sinusitis     Resolved 1/4/2017     COVID-19 01/10/2022    Emphysema lung (HCC)     Empyema (HCC)     GERD (gastroesophageal reflux disease)     Mitral valve prolapse     Palpitations     Last assessed 9/13/2006     Skin disorder     Resolved 1/4/2017     Skin lesions     Resolved 1/4/2017        Past Surgical History:   Procedure Laterality Date    ENDOMETRIAL ABLATION      LIPOMA RESECTION      arm    TUBAL LIGATION         Family History   Problem Relation Age of Onset    Mitral valve prolapse Father     Heart disease Father         open heart surgery    Stroke Father     Breast cancer Sister     Uterine cancer Paternal Grandmother     Colon cancer Paternal Grandfather          Medications have been verified  Objective   Pulse (!) 111   Temp 99 1 °F (37 3 °C) (Tympanic)   Resp 16   SpO2 99%   No LMP recorded  Patient is postmenopausal        Physical Exam     Physical Exam  Vitals and nursing note reviewed  Constitutional:       General: She is awake  She is not in acute distress  Appearance: Normal appearance  She is well-developed and well-groomed  She is not ill-appearing, toxic-appearing or diaphoretic  HENT:      Head: Normocephalic and atraumatic  Right Ear: Tympanic membrane, ear canal and external ear normal       Left Ear: Tympanic membrane, ear canal and external ear normal       Nose: Nose normal       Mouth/Throat:      Lips: Pink  Mouth: Mucous membranes are moist       Pharynx: Oropharynx is clear  Uvula midline  Eyes:      General: Lids are normal  Vision grossly intact  Gaze aligned appropriately  Conjunctiva/sclera: Conjunctivae normal    Neck:      Trachea: Phonation normal    Cardiovascular:      Rate and Rhythm: Normal rate and regular rhythm  Pulses: Normal pulses  Heart sounds: Normal heart sounds  Pulmonary:      Effort: Pulmonary effort is normal  No respiratory distress  Breath sounds: Normal breath sounds  Abdominal:      General: Abdomen is flat   There is no distension  Palpations: Abdomen is soft  Tenderness: There is no abdominal tenderness  There is no right CVA tenderness, left CVA tenderness, guarding or rebound  Musculoskeletal:      Cervical back: Neck supple  No edema, erythema, rigidity or tenderness  Lymphadenopathy:      Cervical: No cervical adenopathy  Skin:     General: Skin is warm and dry  Capillary Refill: Capillary refill takes less than 2 seconds  Coloration: Skin is not pale  Neurological:      General: No focal deficit present  Mental Status: She is alert and oriented to person, place, and time  Mental status is at baseline  Psychiatric:         Attention and Perception: Attention and perception normal          Mood and Affect: Mood and affect normal          Speech: Speech normal          Behavior: Behavior normal  Behavior is cooperative  Thought Content:  Thought content normal          Cognition and Memory: Cognition and memory normal          Judgment: Judgment normal

## 2022-03-16 ENCOUNTER — TELEPHONE (OUTPATIENT)
Dept: DERMATOLOGY | Facility: CLINIC | Age: 57
End: 2022-03-16

## 2022-05-20 ENCOUNTER — OFFICE VISIT (OUTPATIENT)
Dept: GASTROENTEROLOGY | Facility: CLINIC | Age: 57
End: 2022-05-20
Payer: COMMERCIAL

## 2022-05-20 ENCOUNTER — TELEPHONE (OUTPATIENT)
Dept: GASTROENTEROLOGY | Facility: CLINIC | Age: 57
End: 2022-05-20

## 2022-05-20 VITALS
BODY MASS INDEX: 31.89 KG/M2 | SYSTOLIC BLOOD PRESSURE: 138 MMHG | WEIGHT: 180 LBS | HEART RATE: 68 BPM | HEIGHT: 63 IN | DIASTOLIC BLOOD PRESSURE: 70 MMHG

## 2022-05-20 DIAGNOSIS — K21.9 GASTROESOPHAGEAL REFLUX DISEASE WITHOUT ESOPHAGITIS: Primary | ICD-10-CM

## 2022-05-20 DIAGNOSIS — Z12.11 ENCOUNTER FOR SCREENING COLONOSCOPY: ICD-10-CM

## 2022-05-20 PROCEDURE — 99204 OFFICE O/P NEW MOD 45 MIN: CPT | Performed by: INTERNAL MEDICINE

## 2022-05-20 NOTE — PATIENT INSTRUCTIONS
Scheduled date of EGD/colonoscopy (as of today): 8/5/22  Physician performing EGD/colonoscopy: Dr Tasneem Cueva  Location of EGD/colonoscopy: [facility]  Desired bowel prep reviewed with patient: Miralax/Mag Citrate  Instructions reviewed with patient by: Crystal   Clearances:  Procedure Clearance

## 2022-05-20 NOTE — PROGRESS NOTES
[facility] - Outpatient Consultation  Mark Lee 61 y o  female MRN: [id number]  Encounter: [id number]          ASSESSMENT AND PLAN:      1  Gastroesophageal reflux disease without esophagitis  [de-identified] year female come with complaint of chronic heartburn, reflux symptoms, she is taking famotidine 10 mg twice a day over-the-counter which is not helping her, she has a strong family history of GERD, peptic stricture  As per patient her symptoms started getting worse after starting albuterol inhaler, she is chronic smoker, she try to quit smoking and when she quit smoking she gave regain the weight  She denies any dysphagia or odynophagia, no chronic NSAID use, will plan for upper endoscopy and then discuss about further treatment plan, had a long discussion with the patient about strict anti-reflux diet and avoiding late dinner  - EGD; Future    2  Encounter for screening colonoscopy  Never had a screening colonoscopy, history of occasional constipation, no abdominal pain, no change in bowel habit  Discuss about risk and benefit of the skin colonoscopy, she will stay on clear liquid diet day before the procedure, she will drink low volume bowel prep in split dosing, depend upon endoscopy and colonoscopy finding will discuss about further treatment plan and follow-up  - Colonoscopy; Future    3  Chronic smoker-discussed in detail about smoking cessation, she tried in the past, she quit and then start smoking again     ______________________________________________________________________    HPI:  61 year and female referred to us for screening colonoscopy and chronic heartburn  She has a history of COPD, recently started on albuterol inhaler which lead to worsening of heartburn and reflux symptoms, she start using over-the-counter famotidine 10 mg twice a day but symptoms still persist, she denies any dysphagia or odynophagia, she denies any nausea, no vomiting    Her father had a history of peptic stricture and had esophageal dilatation was done in the past   Her grand father had history of colon cancer  She complained about occasional constipation but no melena or rectal bleeding, no abnormal weight loss, she is chronic smoker, she quit and then resume smoking again       REVIEW OF SYSTEMS:    CONSTITUTIONAL: Denies any fever, chills, rigors, and weight loss  HEENT: No earache or tinnitus  Denies hearing loss or visual disturbances  CARDIOVASCULAR: No chest pain or palpitations  RESPIRATORY: Denies any cough, hemoptysis, shortness of breath or dyspnea on exertion  GASTROINTESTINAL: As noted in the History of Present Illness  GENITOURINARY: No problems with urination  Denies any hematuria or dysuria  NEUROLOGIC: No dizziness or vertigo, denies headaches  MUSCULOSKELETAL: Denies any muscle or joint pain  SKIN: Denies skin rashes or itching  ENDOCRINE: Denies excessive thirst  Denies intolerance to heat or cold  PSYCHOSOCIAL: Denies depression or anxiety  Denies any recent memory loss         Historical Information   Past Medical History:   Diagnosis Date    Allergic rhinitis     Resolved 2/23/2017     Chronic sinusitis     Resolved 1/4/2017     COVID-19 01/10/2022    Emphysema lung (HCC)     Empyema (HCC)     GERD (gastroesophageal reflux disease)     Mitral valve prolapse     Palpitations     Last assessed 9/13/2006     Skin disorder     Resolved 1/4/2017     Skin lesions     Resolved 1/4/2017      Past Surgical History:   Procedure Laterality Date    ENDOMETRIAL ABLATION      LIPOMA RESECTION      arm    TUBAL LIGATION       Social History   Social History     Substance and Sexual Activity   Alcohol Use Not Currently     Social History     Substance and Sexual Activity   Drug Use Never     Social History     Tobacco Use   Smoking Status Former Smoker    Packs/day: 0 50    Years: 30 00    Pack years: 15 00   Smokeless Tobacco Never Used   Tobacco Comment Current smoker - As per [employer]      Family History   Problem Relation Age of Onset    Mitral valve prolapse Father     Heart disease Father         open heart surgery    Stroke Father     Breast cancer Sister     Uterine cancer Paternal Grandmother     Colon cancer Paternal Grandfather        Meds/Allergies       Current Outpatient Medications:     albuterol (PROVENTIL HFA,VENTOLIN HFA) 90 mcg/act inhaler    famotidine-calcium carbonate-magnesium hydroxide (Pepcid Complete) -165 MG CHEW    fexofenadine (ALLEGRA) 180 MG tablet    Stiolto Respimat 2 5-2 5 MCG/ACT inhaler    cyclobenzaprine (FLEXERIL) 10 mg tablet    naproxen (Naprosyn) 500 mg tablet    Allergies   Allergen Reactions    Diphenhydramine      Reaction Date: 18Apr2005;     Penicillins      Reaction Date: 18Apr2005; Objective     Blood pressure 138/70, pulse 68, height 5' 3" (1 6 m), weight 81 6 kg (180 lb)  Body mass index is 31 89 kg/m²  PHYSICAL EXAM:      General Appearance:   Alert, cooperative, no distress   HEENT:   Normocephalic, atraumatic, anicteric      Neck:  Supple, symmetrical, trachea midline   Lungs:   Clear to auscultation bilaterally; no rales, rhonchi or wheezing; respirations unlabored    Heart[de-identified]   Regular rate and rhythm; no murmur, rub, or gallop  Abdomen:   Soft, non-tender, non-distended; normal bowel sounds; no masses, no organomegaly    Genitalia:   Deferred    Rectal:   Deferred    Extremities:  No cyanosis, clubbing or edema    Pulses:  2+ and symmetric    Skin:  No jaundice, rashes, or lesions    Lymph nodes:  No palpable cervical lymphadenopathy        Lab Results:   No visits with results within 1 Day(s) from this visit  Latest known visit with results is:   Office Visit on 02/16/2022   Component Date Value    SARS-CoV-2 02/16/2022 Negative          Radiology Results:   No results found

## 2022-05-20 NOTE — TELEPHONE ENCOUNTER
Procedure Clearance pt said she has Mitrovalve Prolapse but hasn't been seen by a cardiologist in some time and will be scheduling an appt with Dr Camila Mueller  Clearance form was  Faxed  Pt will be having a Flip at [facility] on 8/5/22 with Dr Xochilt Ayon  Awaiting response  Pt # [phone]

## 2022-05-25 ENCOUNTER — CONSULT (OUTPATIENT)
Dept: PULMONOLOGY | Facility: MEDICAL CENTER | Age: 57
End: 2022-05-25
Payer: COMMERCIAL

## 2022-05-25 VITALS
RESPIRATION RATE: 12 BRPM | WEIGHT: 180.8 LBS | SYSTOLIC BLOOD PRESSURE: 114 MMHG | TEMPERATURE: 98.6 F | DIASTOLIC BLOOD PRESSURE: 68 MMHG | OXYGEN SATURATION: 96 % | HEIGHT: 63 IN | HEART RATE: 86 BPM | BODY MASS INDEX: 32.04 KG/M2

## 2022-05-25 DIAGNOSIS — J43.2 CENTRILOBULAR EMPHYSEMA (HCC): Primary | ICD-10-CM

## 2022-05-25 DIAGNOSIS — F17.210 TOBACCO SMOKER, LESS THAN 10 CIGARETTES PER DAY: ICD-10-CM

## 2022-05-25 DIAGNOSIS — E66.09 CLASS 1 OBESITY DUE TO EXCESS CALORIES WITHOUT SERIOUS COMORBIDITY WITH BODY MASS INDEX (BMI) OF 32.0 TO 32.9 IN ADULT: ICD-10-CM

## 2022-05-25 PROCEDURE — 99244 OFF/OP CNSLTJ NEW/EST MOD 40: CPT | Performed by: INTERNAL MEDICINE

## 2022-05-25 RX ORDER — ALBUTEROL SULFATE 90 UG/1
2 AEROSOL, METERED RESPIRATORY (INHALATION) EVERY 4 HOURS PRN
Qty: 8.5 G | Refills: 7 | Status: SHIPPED | OUTPATIENT
Start: 2022-05-25

## 2022-05-25 NOTE — PROGRESS NOTES
Assessment/Plan:       Problem List Items Addressed This Visit        Respiratory    Centrilobular emphysema ([address] ) - Primary     Mirta did have complete PFT done at other institution and I reviewed results with her  Lung volumes are as follows:    Post bronchodilator    FVC -3 03 L  or 103% of predicted  FEV1 - 1 99 L   83%  FEV1/FVC% - 66%    %  TLC 5 12 L - 109%    DLCO - 73%    This shows mild airflow obstruction  Present she is using Stiolto 2 puffs once a day and doing okay with this  Still has occasional wheeze and cough at times  Next time I get samples of Trelegy 100 mcg or Breztri I will give her samples to try in place of the Stiolto to see if these work better for her  Relevant Medications    albuterol (ProAir HFA) 90 mcg/act inhaler       Other    Tobacco smoker, less than 10 cigarettes per day     Is smoking about half pack of cigarettes per day  Did talk about smoking cessation  I did prescription for Nicotrol inhaler she can use 1 cartridge per hours needed up to 10 per day           Relevant Medications    nicotine (NICOTROL) 10 MG inhaler    Class 1 obesity due to excess calories without serious comorbidity with body mass index (BMI) of 32 0 to 32 9 in adult     She does not have excessive daytime somnolence or loud snoring  Does not wake up gasping for air  She did gain 30 lb since November last year  I did talk about diet and weight loss                   Plan for follow up:    Return in about 1 year (around 5/25/2023)  All questions are answered to the patient's satisfaction and understanding  She verbalizes understanding  She is encouraged to call with any further questions or concerns  Portions of the record may have been created with voice recognition software  Occasional wrong word or "sound a like" substitutions may have occurred due to the inherent limitations of voice recognition software    Read the chart carefully and recognize, using context, where substitutions have occurred  a    Electronically Signed by Saul Schuster DO    ______________________________________________________________________    Chief Complaint: No chief complaint on file  Patient ID: German Patiño is a 61 y o  y o  female has a past medical history of Allergic rhinitis, Chronic sinusitis, COVID-19 (01/10/2022), Emphysema lung ([address] ), Empyema ([facility] [address] ), GERD (gastroesophageal reflux disease), Mitral valve prolapse, Palpitations, Skin disorder, and Skin lesions  5/25/2022  Patient presents today for initial visit for pulmonary evaluation      HPI     German Patiño is 61years old and presents for pulmonary evaluation  She has history of GERD and follows with gastroenterologist Dr Chloe Hope  She had complaining that after she started using albuterol inhaler she started experience some gastric reflux symptoms including heartburn  Even though she took famotidine 10 mg twice a day her symptoms persisted  He has scheduled her for upper endoscopy  She has history smoking about 1 pack of cigarettes per day and started smoking around age 12years old  She did reduce her cigarette smoking recently to half a pack per day  She does use Stiolto 2 puffs once a day which seems to help her  Does have some shortness of breath sometimes going up a flight of stairs  She states she did have COVID in beginning of this year was not hospitalized  She has gained about 30 lb since November of 2021  No excessive daytime somnolence or nocturnal dyspnea      Review of Systems   Constitutional: Negative for chills, fever and unexpected weight change  HENT: Negative for congestion, rhinorrhea and sore throat  Eyes: Negative for discharge and redness  Respiratory: Negative for cough  Cardiovascular: Negative for chest pain, palpitations and leg swelling  Gastrointestinal: Negative for abdominal distention, abdominal pain and nausea  Does get periodic gastric reflux     Endocrine: Negative for polydipsia and polyphagia  Genitourinary: Negative for dysuria  Musculoskeletal: Negative for joint swelling and myalgias  Skin: Negative for rash  Neurological: Negative for light-headedness  Psychiatric/Behavioral: Negative for decreased concentration  Social history: She reports that she has been smoking cigarettes  She has a 45 00 pack-year smoking history  She has never used smokeless tobacco  She reports previous alcohol use  She reports that she does not use drugs  Past surgical history:   Past Surgical History:   Procedure Laterality Date    ENDOMETRIAL ABLATION      LIPOMA RESECTION      arm    TUBAL LIGATION       Family history:   Family History   Problem Relation Age of Onset    Mitral valve prolapse Father     Heart disease Father         open heart surgery    Stroke Father     Breast cancer Sister     Uterine cancer Paternal Grandmother     Colon cancer Paternal Grandfather          There is no immunization history on file for this patient  Current Outpatient Medications   Medication Sig Dispense Refill    albuterol (ProAir HFA) 90 mcg/act inhaler Inhale 2 puffs every 4 (four) hours as needed for wheezing 8 5 g 7    famotidine-calcium carbonate-magnesium hydroxide (Pepcid Complete) -165 MG CHEW Chew      fexofenadine (ALLEGRA) 180 MG tablet Take 180 mg by mouth daily      nicotine (NICOTROL) 10 MG inhaler Use 1 cartridge per hour as needed do not exceed 10 per day 168 each 3    Stiolto Respimat 2 5-2 5 MCG/ACT inhaler INHALE 2 PUFFS BY MOUTH EVEY DAY AT THE SAME TIME EACH DAY      cyclobenzaprine (FLEXERIL) 10 mg tablet Take 1 tablet (10 mg total) by mouth 2 (two) times a day as needed for muscle spasms 20 tablet 0    naproxen (Naprosyn) 500 mg tablet Take 1 tablet (500 mg total) by mouth 2 (two) times a day with meals 30 tablet 0     No current facility-administered medications for this visit       Allergies: Diphenhydramine and Penicillins    Objective:  Vitals:    05/25/22 1403   BP: 114/68   Pulse: 86   Resp: 12   Temp: 98 6 °F (37 °C)   TempSrc: Temporal   SpO2: 96%   Weight: 82 kg (180 lb 12 8 oz)   Height: 5' 3" (1 6 m)   Oxygen Therapy  SpO2: 96 %    Wt Readings from Last 3 Encounters:   05/25/22 82 kg (180 lb 12 8 oz)   05/20/22 81 6 kg (180 lb)   01/20/22 77 1 kg (170 lb)     Body mass index is 32 03 kg/m²  Pulse oximetry testing:  Room air O2 saturation at rest was 97% and after ambulating a flight of stairs and 250 ft her O2 saturation was 96%    Physical Exam  Vitals reviewed  Constitutional:       General: She is not in acute distress  Appearance: Normal appearance  She is well-developed  She is obese  HENT:      Head: Normocephalic  Right Ear: External ear normal       Left Ear: External ear normal       Nose: Nose normal       Mouth/Throat:      Mouth: Mucous membranes are moist       Pharynx: Oropharynx is clear  No oropharyngeal exudate  Eyes:      Conjunctiva/sclera: Conjunctivae normal       Pupils: Pupils are equal, round, and reactive to light  Neck:      Vascular: No JVD  Trachea: No tracheal deviation  Cardiovascular:      Rate and Rhythm: Normal rate and regular rhythm  Heart sounds: Normal heart sounds  Pulmonary:      Effort: Pulmonary effort is normal       Comments: Lung sounds are clear  No wheezes, crackles or rhonchi  Abdominal:      General: There is no distension  Palpations: Abdomen is soft  Tenderness: There is no abdominal tenderness  There is no guarding  Musculoskeletal:      Cervical back: Neck supple  Comments: No edema, cyanosis or clubbing   Lymphadenopathy:      Cervical: No cervical adenopathy  Skin:     General: Skin is warm and dry  Findings: No rash  Neurological:      General: No focal deficit present  Mental Status: She is alert and oriented to person, place, and time     Psychiatric:         Behavior: Behavior normal  Thought Content:  Thought content normal          Lab Review:   Lab Results   Component Value Date    K 3 8 01/21/2022     01/21/2022    CO2 26 01/21/2022    BUN 17 01/21/2022    CREATININE 0 89 01/21/2022    CALCIUM 9 3 01/21/2022     Lab Results   Component Value Date    WBC 7 74 01/21/2022    HGB 12 7 01/21/2022    HCT 40 5 01/21/2022    MCV 92 01/21/2022     01/21/2022       Diagnostics:  I have personally reviewed pertinent films in PACS  Chest x-ray:  Done on 01/21/2022 was normal    ESS: Total score: 2   Her neck is 15 in

## 2022-05-25 NOTE — PATIENT INSTRUCTIONS
Can use Breztri 2 puffs twice a day or Trelegy 100 mcg 1 puff once a day as a possible substitute for Stiolto 1 year having some increased wheezing or shortness of breath    I will contact you once we get samples of these medications    Back telephone # - [phone]

## 2022-05-30 PROBLEM — J43.2 CENTRILOBULAR EMPHYSEMA (HCC): Status: ACTIVE | Noted: 2022-05-30

## 2022-05-30 PROBLEM — F17.210 TOBACCO SMOKER, LESS THAN 10 CIGARETTES PER DAY: Status: ACTIVE | Noted: 2022-05-30

## 2022-05-30 PROBLEM — E66.811 CLASS 1 OBESITY DUE TO EXCESS CALORIES WITHOUT SERIOUS COMORBIDITY WITH BODY MASS INDEX (BMI) OF 32.0 TO 32.9 IN ADULT: Status: ACTIVE | Noted: 2022-05-30

## 2022-05-30 PROBLEM — E66.09 CLASS 1 OBESITY DUE TO EXCESS CALORIES WITHOUT SERIOUS COMORBIDITY WITH BODY MASS INDEX (BMI) OF 32.0 TO 32.9 IN ADULT: Status: ACTIVE | Noted: 2022-05-30

## 2022-05-31 NOTE — ASSESSMENT & PLAN NOTE
Caesar Lynch did have complete PFT done at other institution and I reviewed results with her  Lung volumes are as follows:    Post bronchodilator    FVC -3 03 L  or 103% of predicted  FEV1 - 1 99 L   83%  FEV1/FVC% - 66%    %  TLC 5 12 L - 109%    DLCO - 73%    This shows mild airflow obstruction  Present she is using Stiolto 2 puffs once a day and doing okay with this  Still has occasional wheeze and cough at times  Next time I get samples of Trelegy 100 mcg or Breztri I will give her samples to try in place of the Stiolto to see if these work better for her

## 2022-05-31 NOTE — ASSESSMENT & PLAN NOTE
She does not have excessive daytime somnolence or loud snoring  Does not wake up gasping for air  She did gain 30 lb since November last year    I did talk about diet and weight loss

## 2022-05-31 NOTE — ASSESSMENT & PLAN NOTE
Is smoking about half pack of cigarettes per day  Did talk about smoking cessation    I did prescription for Nicotrol inhaler she can use 1 cartridge per hours needed up to 10 per day

## 2022-06-13 NOTE — TELEPHONE ENCOUNTER
Spoke w/ pt she is going to call to try to get an appt with the cardiologist, so we can get her scheduled for her colonoscopy  I will follow up in 1 week

## 2022-06-24 ENCOUNTER — CONSULT (OUTPATIENT)
Dept: CARDIOLOGY CLINIC | Facility: CLINIC | Age: 57
End: 2022-06-24
Payer: COMMERCIAL

## 2022-06-24 VITALS
DIASTOLIC BLOOD PRESSURE: 80 MMHG | OXYGEN SATURATION: 94 % | HEART RATE: 97 BPM | HEIGHT: 63 IN | BODY MASS INDEX: 31.36 KG/M2 | WEIGHT: 177 LBS | TEMPERATURE: 97.6 F | SYSTOLIC BLOOD PRESSURE: 112 MMHG

## 2022-06-24 DIAGNOSIS — I05.9 MITRAL VALVE DISORDER: Primary | ICD-10-CM

## 2022-06-24 PROCEDURE — 99243 OFF/OP CNSLTJ NEW/EST LOW 30: CPT | Performed by: INTERNAL MEDICINE

## 2022-06-24 PROCEDURE — 93000 ELECTROCARDIOGRAM COMPLETE: CPT | Performed by: INTERNAL MEDICINE

## 2022-06-24 NOTE — PROGRESS NOTES
Consultation - Cardiology Office  [facility]  Gregory Bean 61 y o  female MRN: [id number]  : 1965  Unit/Bed#:  Encounter: [id number]      ASSESSMENT:  Perioperative cardiac risk assessment for EGD and Colonoscopy, sometimes in August    H/o MVP,   diagnosed more than 10 years ago  Has not had any cardiology follow-up  No significant MR murmur detected on auscultation  As per patient to other family members also have mitral valve disease/prolapse in her family and some of them have required intervention    GERD    COPD/sentry lobular emphysema    Tobacco smoker    Obesity, BMI 32 03      RECOMMENDATIONS:  Patient has low to intermediate/acceptable perioperative cardiac risk for planned EGD and colonoscopy  Echocardiogram to reassess mitral valve        Thank you for your consultation  If you have any question please call me at [phone]      Primary Care Physician Requesting Consult: Rock Regalado 33      Reason for Consult / Principal Problem:  Preop evaluation        HPI :     Gregory Bean is a 61y o  year old female who was referred by primary care doctor for perioperative cardiac risk assessment prior to undergoing colonoscopy and EGD  This is routine, 1st time GI screening  Patient also gives a history of mitral valve prolapse and according to her 2 other family members also have mitral valve prolapse and have had complications due to it  She herself was last evaluated more than 10 years ago and has not been seeing any cardiologist   On auscultation there is no evidence of significant mitral regurgitation  She is a chronic smoker and has underlying COPD and therefore baseline dyspnea  This is being managed by pulmonology  I am also recommending an echocardiogram which can be done before or after her GI procedures   She denies any symptoms suggestive of angina or heart failure or syncope  She has no history of MI, CHF or cardiac arrhythmias    She is hemodynamically stable and her EKG does not show any evidence of myocardial ischemia      Review of Systems   Respiratory: Positive for shortness of breath (Dyspnea on exertion due to underlying COPD)  All other systems reviewed and are negative          Historical Information   Past Medical History:   Diagnosis Date    Allergic rhinitis     Resolved 2017     Chronic sinusitis     Resolved 2017     COVID-19 01/10/2022    Emphysema lung (HCC)     Empyema (HCC)     GERD (gastroesophageal reflux disease)     Mitral valve prolapse     Palpitations     Last assessed 2006     Skin disorder     Resolved 2017     Skin lesions     Resolved 2017      Past Surgical History:   Procedure Laterality Date    ENDOMETRIAL ABLATION      LIPOMA RESECTION      arm    TUBAL LIGATION       Social History     Substance and Sexual Activity   Alcohol Use Not Currently     Social History     Substance and Sexual Activity   Drug Use Never     Social History     Tobacco Use   Smoking Status Current Some Day Smoker    Packs/day: 1 00    Years: 45 00    Pack years: 45 00    Types: Cigarettes    Last attempt to quit: 2021    Years since quittin 5   Smokeless Tobacco Never Used   Tobacco Comment    Current smoker - As per [employer]     Family History:   Family History   Problem Relation Age of Onset    Mitral valve prolapse Father     Heart disease Father         open heart surgery    Stroke Father     Breast cancer Sister     Uterine cancer Paternal Grandmother     Colon cancer Paternal Grandfather        Meds/Allergies     Allergies   Allergen Reactions    Diphenhydramine      Reaction Date: 2005;     Penicillins      Reaction Date: 2005;        Current Outpatient Medications:     albuterol (ProAir HFA) 90 mcg/act inhaler, Inhale 2 puffs every 4 (four) hours as needed for wheezing, Disp: 8 5 g, Rfl: 7    famotidine-calcium carbonate-magnesium hydroxide (Pepcid Complete) -165 MG CHEW, Chew, Disp: , Rfl:     fexofenadine (ALLEGRA) 180 MG tablet, Take 180 mg by mouth daily, Disp: , Rfl:     Stiolto Respimat 2 5-2 5 MCG/ACT inhaler, INHALE 2 PUFFS BY MOUTH JET DAY AT THE SAME TIME EACH DAY, Disp: , Rfl:     cyclobenzaprine (FLEXERIL) 10 mg tablet, Take 1 tablet (10 mg total) by mouth 2 (two) times a day as needed for muscle spasms, Disp: 20 tablet, Rfl: 0    naproxen (Naprosyn) 500 mg tablet, Take 1 tablet (500 mg total) by mouth 2 (two) times a day with meals, Disp: 30 tablet, Rfl: 0    nicotine (NICOTROL) 10 MG inhaler, Use 1 cartridge per hour as needed do not exceed 10 per day (Patient not taking: Reported on 6/24/2022), Disp: 168 each, Rfl: 3    Vitals: Blood pressure 112/80, pulse 97, temperature 97 6 °F (36 4 °C), height 5' 3" (1 6 m), weight 80 3 kg (177 lb), SpO2 94 %  ?  Body mass index is 31 35 kg/m²  Vitals:    06/24/22 1252   Weight: 80 3 kg (177 lb)     BP Readings from Last 3 Encounters:   06/24/22 112/80   05/25/22 114/68   05/20/22 138/70       PHYSICAL EXAMINATION:  Neurologic:  Alert & oriented x 3, no new focal deficits, Not in any acute distress,  Constitutional:  Overweight  Eyes:  Pupil equal and reacting to light, conjunctiva normal, No JVP, No LNP   HENT:  Atraumatic, oropharynx moist, Neck- normal range of motion, no tenderness,  Neck supple   Respiratory:  Bilateral air entry, mostly clear to auscultation  Cardiovascular: S1-S2 regular with a I/VI systolic murmur   GI:  Soft, nondistended, normal bowel sounds, nontender, no hepatosplenomegaly appreciated  Musculoskeletal: no tenderness, no deformities  Skin:  Well hydrated, no rash   Lymphatic:  No lymphadenopathy noted   Extremities:  No edema and distal pulses are present    Diagnostic Studies Review Cardio:      EKG:  Normal sinus rhythm, heart rate 97 per minute, possible left atrial enlargement    Cardiac testing:   No results found for this or any previous visit        Imaging:  Chest X-Ray:   No Chest XR results available for this patient  CT-scan of the chest:     No CTA results available for this patient  Lab Review   Lab Results   Component Value Date    WBC 7 74 01/21/2022    HGB 12 7 01/21/2022    HCT 40 5 01/21/2022    MCV 92 01/21/2022    RDW 13 2 01/21/2022     01/21/2022     BMP:  Lab Results   Component Value Date    SODIUM 138 01/21/2022    K 3 8 01/21/2022     01/21/2022    CO2 26 01/21/2022    BUN 17 01/21/2022    CREATININE 0 89 01/21/2022    GLUC 115 01/21/2022    CALCIUM 9 3 01/21/2022    CORRECTEDCA 9 9 01/21/2022    EGFR 72 01/21/2022     LFT:  Lab Results   Component Value Date    AST 12 01/21/2022    ALT 28 01/21/2022    ALKPHOS 80 01/21/2022    TP 6 8 01/21/2022    ALB 3 3 (L) 01/21/2022      No results found for: GGB9NWJHHBMS  No components found for: TSH3  No results found for: HGBA1C  Lipid Profile:   No results found for: CHOLESTEROL, HDL, LDLCALC, TRIG  No results found for: CHOLESTEROL  No results found for: CKTOTAL, CKMB, CKMBINDEX, TROPONINI  No results found for: NTBNP   No results found for this or any previous visit (from the past 672 hour(s))  Dr Su Cm MD, [facility] [place]      "This note has been constructed using a voice recognition system  Therefore there may be syntax, spelling, and/or grammatical errors   Please call if you have any questions  "

## 2022-07-05 ENCOUNTER — OFFICE VISIT (OUTPATIENT)
Dept: FAMILY MEDICINE CLINIC | Facility: CLINIC | Age: 57
End: 2022-07-05
Payer: COMMERCIAL

## 2022-07-05 VITALS
DIASTOLIC BLOOD PRESSURE: 72 MMHG | WEIGHT: 179 LBS | SYSTOLIC BLOOD PRESSURE: 118 MMHG | RESPIRATION RATE: 18 BRPM | BODY MASS INDEX: 31.71 KG/M2 | OXYGEN SATURATION: 99 % | TEMPERATURE: 97.3 F | HEART RATE: 80 BPM | HEIGHT: 63 IN

## 2022-07-05 DIAGNOSIS — H10.31 ACUTE CONJUNCTIVITIS OF RIGHT EYE, UNSPECIFIED ACUTE CONJUNCTIVITIS TYPE: Primary | ICD-10-CM

## 2022-07-05 PROCEDURE — 99213 OFFICE O/P EST LOW 20 MIN: CPT | Performed by: NURSE PRACTITIONER

## 2022-07-05 RX ORDER — MOXIFLOXACIN 5 MG/ML
1 SOLUTION/ DROPS OPHTHALMIC 3 TIMES DAILY
Qty: 3 ML | Refills: 0 | Status: SHIPPED | OUTPATIENT
Start: 2022-07-05 | End: 2022-07-12

## 2022-07-05 NOTE — PROGRESS NOTES
Assessment/Plan:    1  Acute conjunctivitis of right eye, unspecified acute conjunctivitis type  -     moxifloxacin (VIGAMOX) 0 5 % ophthalmic solution; Administer 1 drop to the right eye 3 (three) times a day for 7 days            Patient Instructions:  Advised on warm compresses  Supportive care discussed and advised  Advised to RTO for any worsening and no improvement  Follow up for no improvement and worsening of conditions  Patient advised and educated when to see immediate medical care  Return if symptoms worsen or fail to improve, for Annual physical       Future Appointments   Date Time Provider Ravin Ferrari   7/11/2022  7:00 AM AN HV ECHO 1 AN HV Car NI [facility]   8/5/2022  7:30 AM EA GI ROOM 03 [facility]           Subjective:      Patient ID: Estelita Roque is a 61 y o  female  Chief Complaint   Patient presents with    Conjunctivitis     Possible pink eye in right eye nm  lpn         Vitals:  /72   Pulse 80   Temp (!) 97 3 °F (36 3 °C)   Resp 18   Ht 5' 3" (1 6 m)   Wt 81 2 kg (179 lb)   SpO2 99%   BMI 31 71 kg/m²     HPI  Patient stated that having right eye itching from couple of days and progressed to redness of right eye with thickish yellowish discharge  Denies fever, chills and sob  Also having seasonal allergies and mild headache at times and denies any vision changes and dizziness  Not using any medications for symptoms  PHQ-2/9 Depression Screening    Little interest or pleasure in doing things: 0 - not at all  Feeling down, depressed, or hopeless: 0 - not at all  PHQ-2 Score: 0  PHQ-2 Interpretation: Negative depression screen             The following portions of the patient's history were reviewed and updated as appropriate: allergies, current medications, past family history, past medical history, past social history, past surgical history and problem list       Review of Systems   Constitutional: Negative  HENT: Negative      Eyes: Positive for discharge, redness and itching  Negative for visual disturbance  Respiratory: Negative  Cardiovascular: Negative  Gastrointestinal: Negative  Neurological: Positive for headaches  Psychiatric/Behavioral: Negative  Objective:    Social History     Tobacco Use   Smoking Status Current Some Day Smoker    Packs/day: 1 00    Years: 45 00    Pack years: 45 00    Types: Cigarettes    Last attempt to quit: 2021    Years since quittin 5   Smokeless Tobacco Never Used   Tobacco Comment    Current smoker - As per [employer]       Allergies: Allergies   Allergen Reactions    Diphenhydramine      Reaction Date: 2005;     Penicillins      Reaction Date: 2005;          Current Outpatient Medications   Medication Sig Dispense Refill    albuterol (ProAir HFA) 90 mcg/act inhaler Inhale 2 puffs every 4 (four) hours as needed for wheezing 8 5 g 7    famotidine-calcium carbonate-magnesium hydroxide (Pepcid Complete) -165 MG CHEW Chew      fexofenadine (ALLEGRA) 180 MG tablet Take 180 mg by mouth daily      moxifloxacin (VIGAMOX) 0 5 % ophthalmic solution Administer 1 drop to the right eye 3 (three) times a day for 7 days 3 mL 0    nicotine (NICOTROL) 10 MG inhaler Use 1 cartridge per hour as needed do not exceed 10 per day 168 each 3    Stiolto Respimat 2 5-2 5 MCG/ACT inhaler INHALE 2 PUFFS BY MOUTH JET DAY AT THE SAME TIME EACH DAY       No current facility-administered medications for this visit  Physical Exam  Constitutional:       Appearance: Normal appearance  HENT:      Head: Normocephalic and atraumatic  Nose: Nose normal    Eyes:      Conjunctiva/sclera:      Right eye: Right conjunctiva is injected (mildly)  Left eye: Left conjunctiva is not injected  Cardiovascular:      Rate and Rhythm: Normal rate and regular rhythm  Pulses: Normal pulses  Heart sounds: Normal heart sounds     Pulmonary:      Effort: Pulmonary effort is normal       Breath sounds: Normal breath sounds  Skin:     General: Skin is warm and dry  Findings: No rash  Neurological:      Mental Status: She is alert and oriented to person, place, and time  Psychiatric:         Mood and Affect: Mood normal          Behavior: Behavior normal          Thought Content:  Thought content normal          Judgment: Judgment normal                      REGI Cedeno

## 2022-07-14 DIAGNOSIS — J43.2 CENTRILOBULAR EMPHYSEMA (HCC): Primary | ICD-10-CM

## 2022-07-15 ENCOUNTER — TELEPHONE (OUTPATIENT)
Dept: PULMONOLOGY | Facility: CLINIC | Age: 57
End: 2022-07-15

## 2022-07-15 RX ORDER — TIOTROPIUM BROMIDE AND OLODATEROL 3.124; 2.736 UG/1; UG/1
2 SPRAY, METERED RESPIRATORY (INHALATION) DAILY
Qty: 4 G | Refills: 5 | Status: SHIPPED | OUTPATIENT
Start: 2022-07-15 | End: 2022-08-11 | Stop reason: SDUPTHER

## 2022-07-15 NOTE — TELEPHONE ENCOUNTER
----- Message from Sejal Busch sent at 7/12/2022  9:40 AM EDT -----  Regarding: I need new Rx for Stiolto  [facility] and I was told I did not have any refills on this medication , can you refill this medication

## 2022-07-20 NOTE — TELEPHONE ENCOUNTER
Per Dr Jessica Collins office note from 6/24/22  Patient has low to intermediate/acceptable perioperative cardiac risk for planned EGD and colonoscopy

## 2022-07-25 ENCOUNTER — TELEPHONE (OUTPATIENT)
Dept: GASTROENTEROLOGY | Facility: AMBULARY SURGERY CENTER | Age: 57
End: 2022-07-25

## 2022-07-25 NOTE — TELEPHONE ENCOUNTER
----- Message from Adelina Eldridge sent at 7/25/2022 10:43 AM EDT -----  Regarding: colonoscopy prep  I have a colonoscopy appointment on 08/05/22 I have misplaced the papers with the prep explain   could you fax them to me at work ( [phone]) or send them through [facility] ?   thank you for your time

## 2022-07-25 NOTE — TELEPHONE ENCOUNTER
Spoke with patient  Prep instructions were emailed to patient  She also needed to reschedule her procedure  She is now rescheduled to 9/14/22 with Dr Fatoumata Woody at [place]

## 2022-08-04 ENCOUNTER — OFFICE VISIT (OUTPATIENT)
Dept: FAMILY MEDICINE CLINIC | Facility: CLINIC | Age: 57
End: 2022-08-04
Payer: COMMERCIAL

## 2022-08-04 VITALS
WEIGHT: 178 LBS | SYSTOLIC BLOOD PRESSURE: 106 MMHG | TEMPERATURE: 97.8 F | BODY MASS INDEX: 31.54 KG/M2 | HEART RATE: 76 BPM | RESPIRATION RATE: 22 BRPM | DIASTOLIC BLOOD PRESSURE: 72 MMHG | HEIGHT: 63 IN | OXYGEN SATURATION: 99 %

## 2022-08-04 DIAGNOSIS — B34.9 VIRAL INFECTION, UNSPECIFIED: Primary | ICD-10-CM

## 2022-08-04 LAB
SARS-COV-2 AG UPPER RESP QL IA: NEGATIVE
VALID CONTROL: NORMAL

## 2022-08-04 PROCEDURE — U0003 INFECTIOUS AGENT DETECTION BY NUCLEIC ACID (DNA OR RNA); SEVERE ACUTE RESPIRATORY SYNDROME CORONAVIRUS 2 (SARS-COV-2) (CORONAVIRUS DISEASE [COVID-19]), AMPLIFIED PROBE TECHNIQUE, MAKING USE OF HIGH THROUGHPUT TECHNOLOGIES AS DESCRIBED BY CMS-2020-01-R: HCPCS | Performed by: NURSE PRACTITIONER

## 2022-08-04 PROCEDURE — U0005 INFEC AGEN DETEC AMPLI PROBE: HCPCS | Performed by: NURSE PRACTITIONER

## 2022-08-04 PROCEDURE — 99213 OFFICE O/P EST LOW 20 MIN: CPT | Performed by: NURSE PRACTITIONER

## 2022-08-04 PROCEDURE — 87811 SARS-COV-2 COVID19 W/OPTIC: CPT | Performed by: NURSE PRACTITIONER

## 2022-08-04 NOTE — LETTER
August 4, 2022     Patient: Estelita Roque  YOB: 1965  Date of Visit: 8/4/2022      To Whom it May Concern:    Estelita Roque is under my professional care  Janett Sánchez was seen in my office on 8/4/2022  Janett Sánchez may return to work on 8/8/2022  If you have any questions or concerns, please don't hesitate to call           Sincerely,          REGI Ruiz        CC: No Recipients

## 2022-08-04 NOTE — PATIENT INSTRUCTIONS
Viral Syndrome   AMBULATORY CARE:   Viral syndrome  is a term used for symptoms of an infection caused by a virus  Viruses are spread easily from person to person through the air and on shared items  Signs and symptoms  may start slowly or suddenly and last hours to days  They can be mild to severe and can change over days or hours  You may have any of the following:  Fever and chills    A runny or stuffy nose    Cough, sore throat, or hoarseness    Headache, or pain and pressure around your eyes    Muscle aches and joint pain    Shortness of breath or wheezing    Abdominal pain, cramps, and diarrhea    Nausea, vomiting, or loss of appetite    Call your local emergency number (911 in the [address] [facility]) or have someone else call if:   You have a seizure  You cannot be woken  You have chest pain or trouble breathing  Seek care immediately if:   You have a stiff neck, a bad headache, and sensitivity to light  You feel weak, dizzy, or confused  You stop urinating or urinate a lot less than usual     You cough up blood or thick yellow or green mucus  You have severe abdominal pain or your abdomen is larger than usual     Call your doctor if:   Your symptoms do not get better with treatment or get worse after 3 days  You have a rash or ear pain  You have burning when you urinate  You have questions or concerns about your condition or care  Treatment for viral syndrome  may include medicines to manage your symptoms  Antibiotics are not given for a viral infection  You may  need any of the following:  Acetaminophen  decreases pain and fever  It is available without a doctor's order  Ask how much to take and how often to take it  Follow directions  Read the labels of all other medicines you are using to see if they also contain acetaminophen, or ask your doctor or pharmacist  Acetaminophen can cause liver damage if not taken correctly   Do not use more than 4 grams (4,000 milligrams) total of acetaminophen in one day  NSAIDs , such as ibuprofen, help decrease swelling, pain, and fever  NSAIDs can cause stomach bleeding or kidney problems in certain people  If you take blood thinner medicine, always ask your healthcare provider if NSAIDs are safe for you  Always read the medicine label and follow directions  Cold medicine  helps decrease swelling, control a cough, and relieve chest or nasal congestion  Saline nasal spray  helps decrease nasal congestion  Manage your symptoms:   Drink liquids as directed to prevent dehydration  Ask how much liquid to drink each day and which liquids are best for you  Ask if you should drink an oral rehydration solution (ORS)  An ORS has the right amounts of water, salts, and sugar you need to replace body fluids  This may help prevent dehydration caused by vomiting or diarrhea  Do not drink liquids with caffeine  Liquids with caffeine can make dehydration worse  Get plenty of rest to help your body heal   Take naps throughout the day  Ask your healthcare provider when you can return to work and your normal activities  Use a cool mist humidifier to help you breathe easier  Ask your healthcare provider how to use a cool mist humidifier  Eat honey or use cough drops for a sore throat  Cough drops are available without a doctor's order  Follow directions for taking cough drops  Do not smoke or be close to anyone who is smoking  Nicotine and other chemicals in cigarettes and cigars can cause lung damage  Smoking can also delay healing  Ask your healthcare provider for information if you currently smoke and need help to quit  E-cigarettes or smokeless tobacco still contain nicotine  Talk to your healthcare provider before you use these products  Prevent the spread of germs:       Wash your hands often  Wash your hands several times each day  Wash after you use the bathroom, change a child's diaper, and before you prepare or eat food   Use soap and water every time  Rub your soapy hands together, lacing your fingers  Wash the front and back of your hands, and in between your fingers  Use the fingers of one hand to scrub under the fingernails of the other hand  Wash for at least 20 seconds  Rinse with warm, running water for several seconds  Then dry your hands with a clean towel or paper towel  Use hand  that contains alcohol if soap and water are not available  Do not touch your eyes, nose, or mouth without washing your hands first          Cover a sneeze or cough  Use a tissue that covers your mouth and nose  Throw the tissue away in a trash can right away  Use the bend of your arm if a tissue is not available  Wash your hands well with soap and water or use a hand   Stay away from others while you are sick  Avoid crowds as much as possible  Ask about vaccines you may need  Talk to your healthcare provider about your vaccine history  He or she will tell you which vaccines you need, and when to get them  Get the influenza (flu) vaccine as soon as recommended each year  The flu vaccine is available starting in September or October  Flu viruses change, so it is important to get a flu vaccine every year  Get the pneumonia vaccine if recommended  This vaccine is usually recommended every 5 years  Your provider will tell you when to get this vaccine, if needed  Follow up with your doctor as directed:  Write down your questions so you remember to ask them during your visits  © Copyright [employer] 2022 Information is for End User's use only and may not be sold, redistributed or otherwise used for commercial purposes  All illustrations and images included in CareNotes® are the copyrighted property of A D A M , Inc  or Brennan Swift  The above information is an  only  It is not intended as medical advice for individual conditions or treatments   Talk to your doctor, nurse or pharmacist before following any medical regimen to see if it is safe and effective for you

## 2022-08-04 NOTE — PROGRESS NOTES
Assessment/Plan:    1  Viral infection, unspecified  -     Poct Covid 19 Rapid Antigen Test  -     COVID Only - Office Collect          Patient Instructions:  Increase fluid intake, saline nasal rinses, and hot tea with honey and lemon  Cool air humidification can be helpful as well  May take Tylenol as needed for pain or fevers  Mucinex D for sinus congestion or Coricidin HBP if you have high blood pressure or a heart condition  Mucinex or Robitussin DM are effective for cough and chest congestion  Supportive care discussed and advised  Advised to RTO for any worsening and no improvement  Follow up for no improvement and worsening of conditions  Patient advised and educated when to see immediate medical care  Return if symptoms worsen or fail to improve  Future Appointments   Date Time Provider Ravin Ferrari   8/22/2022 11:00 AM [place] ECHO RM 1 [place] Car [place] [address]   9/14/2022 10:15 AM  GI ROOM 03 [facility]           Subjective:      Patient ID: Lara Elise is a 61 y o  female  Chief Complaint   Patient presents with    Cough    Headache    Sore Throat    sinus issues    Generalized Body Aches    Fever     Started last night  [id number]         Vitals:  /72   Pulse 76   Temp 97 8 °F (36 6 °C)   Resp 22   Ht 5' 3" (1 6 m)   Wt 80 7 kg (178 lb)   SpO2 99%   BMI 31 53 kg/m²     HPI   Patient started with symptoms on 8/3/2022 with cough, sore throat, post nasal drip, body aches and sinus pressure  Denies sob and chills  Felt feverish  Works around patients    Denies any known exposure to covid-19 positive or presumed patient          PHQ-2/9 Depression Screening    Little interest or pleasure in doing things: 0 - not at all  Feeling down, depressed, or hopeless: 0 - not at all  PHQ-2 Score: 0  PHQ-2 Interpretation: Negative depression screen             The following portions of the patient's history were reviewed and updated as appropriate: allergies, current medications, past family history, past medical history, past social history, past surgical history and problem list       Review of Systems   Constitutional: Negative for chills, diaphoresis, fatigue, fever and unexpected weight change  HENT: Positive for postnasal drip, sinus pressure and sore throat  Negative for congestion, dental problem, drooling, ear discharge, ear pain, facial swelling, hearing loss, mouth sores, nosebleeds, rhinorrhea, sinus pain, sneezing, tinnitus, trouble swallowing and voice change  Respiratory: Positive for cough  Negative for chest tightness, shortness of breath and wheezing  Cardiovascular: Negative  Gastrointestinal: Negative for abdominal pain, constipation, diarrhea, nausea and vomiting  Musculoskeletal: Positive for myalgias  Skin: Negative  Neurological: Positive for headaches  Negative for dizziness and light-headedness  Objective:    Social History     Tobacco Use   Smoking Status Current Some Day Smoker    Packs/day: 1     Years: 45 00    Pack years: 45 00    Types: Cigarettes    Last attempt to quit: 2021    Years since quittin 6   Smokeless Tobacco Never Used   Tobacco Comment    Current smoker - As per [employer]       Allergies:    Allergies   Allergen Reactions    Diphenhydramine      Reaction Date: 2005;     Penicillins      Reaction Date: 2005;          Current Outpatient Medications   Medication Sig Dispense Refill    albuterol (ProAir HFA) 90 mcg/act inhaler Inhale 2 puffs every 4 (four) hours as needed for wheezing 8 5 g 7    famotidine-calcium carbonate-magnesium hydroxide (Pepcid Complete) -165 MG CHEW Chew      fexofenadine (ALLEGRA) 180 MG tablet Take 180 mg by mouth daily      nicotine (NICOTROL) 10 MG inhaler Use 1 cartridge per hour as needed do not exceed 10 per day 168 each 3    Stiolto Respimat 2 5-2 5 MCG/ACT inhaler Inhale 2 puffs daily 4 g 5     No current facility-administered medications for this visit  Physical Exam  Vitals reviewed  Constitutional:       Appearance: Normal appearance  She is well-developed  HENT:      Head: Normocephalic  Right Ear: Tympanic membrane, ear canal and external ear normal       Left Ear: Tympanic membrane, ear canal and external ear normal       Nose: Nose normal       Right Sinus: No maxillary sinus tenderness or frontal sinus tenderness  Left Sinus: No maxillary sinus tenderness or frontal sinus tenderness  Mouth/Throat:      Mouth: No oral lesions  Pharynx: No oropharyngeal exudate or posterior oropharyngeal erythema  Cardiovascular:      Rate and Rhythm: Normal rate and regular rhythm  Heart sounds: Normal heart sounds  Pulmonary:      Effort: Pulmonary effort is normal       Breath sounds: Normal breath sounds  Musculoskeletal:         General: Normal range of motion  Cervical back: Neck supple  Lymphadenopathy:      Cervical:      Right cervical: No superficial or posterior cervical adenopathy  Left cervical: No superficial or posterior cervical adenopathy  Skin:     General: Skin is warm and dry  Neurological:      Mental Status: She is alert and oriented to person, place, and time  Psychiatric:         Behavior: Behavior normal          Thought Content:  Thought content normal          Judgment: Judgment normal                      REGI Sebastian

## 2022-08-05 ENCOUNTER — TELEPHONE (OUTPATIENT)
Dept: FAMILY MEDICINE CLINIC | Facility: CLINIC | Age: 57
End: 2022-08-05

## 2022-08-05 LAB — SARS-COV-2 RNA RESP QL NAA+PROBE: NEGATIVE

## 2022-08-05 NOTE — TELEPHONE ENCOUNTER
----- Message from Lucia Camarillo MD sent at 8/5/2022  2:38 PM EDT -----  Can we please let pt know that covid is negative  Thank you

## 2022-08-11 DIAGNOSIS — J43.2 CENTRILOBULAR EMPHYSEMA (HCC): ICD-10-CM

## 2022-08-11 RX ORDER — TIOTROPIUM BROMIDE AND OLODATEROL 3.124; 2.736 UG/1; UG/1
2 SPRAY, METERED RESPIRATORY (INHALATION) DAILY
Qty: 12 G | Refills: 5 | Status: SHIPPED | OUTPATIENT
Start: 2022-08-11 | End: 2022-10-12

## 2022-08-22 ENCOUNTER — HOSPITAL ENCOUNTER (OUTPATIENT)
Dept: NON INVASIVE DIAGNOSTICS | Facility: HOSPITAL | Age: 57
Discharge: HOME | End: 2022-08-22
Attending: INTERNAL MEDICINE
Payer: COMMERCIAL

## 2022-08-22 VITALS
HEART RATE: 92 BPM | HEIGHT: 63 IN | DIASTOLIC BLOOD PRESSURE: 73 MMHG | WEIGHT: 178 LBS | SYSTOLIC BLOOD PRESSURE: 114 MMHG | BODY MASS INDEX: 31.54 KG/M2

## 2022-08-22 DIAGNOSIS — I05.9 MITRAL VALVE DISORDER: ICD-10-CM

## 2022-08-22 LAB
AORTIC ROOT: 3.8 CM
APICAL FOUR CHAMBER EJECTION FRACTION: 60 %
AV LVOT PEAK GRADIENT: 7 MMHG
AV PEAK GRADIENT: 10 MMHG
DOP CALC LVOT AREA: 3.14 CM2
DOP CALC LVOT DIAMETER: 2 CM
E WAVE DECELERATION TIME: 176 MS
E/A RATIO: 0.94
FRACTIONAL SHORTENING: 31 (ref 28–44)
INTERVENTRICULAR SEPTUM IN DIASTOLE (PARASTERNAL SHORT AXIS VIEW): 1.2 CM
INTERVENTRICULAR SEPTUM: 1.2 CM (ref 0.6–1.1)
LAAS-AP2: 18.7 CM2
LAAS-AP4: 20.5 CM2
LEFT ATRIUM SIZE: 3.8 CM
LEFT ATRIUM VOLUME INDEX (MOD BIPLANE): 20.7
LEFT INTERNAL DIMENSION IN SYSTOLE: 3.1 CM (ref 2.1–4)
LEFT VENTRICULAR INTERNAL DIMENSION IN DIASTOLE: 4.5 CM (ref 3.5–6)
LEFT VENTRICULAR POSTERIOR WALL IN END DIASTOLE: 1.2 CM
LEFT VENTRICULAR STROKE VOLUME: 56 ML
LVSV (TEICH): 56 ML
MV E'TISSUE VEL-LAT: 7 CM/S
MV E'TISSUE VEL-SEP: 6 CM/S
MV PEAK A VEL: 0.88 M/S
MV PEAK E VEL: 83 CM/S
MV STENOSIS PRESSURE HALF TIME: 51 MS
MV VALVE AREA P 1/2 METHOD: 4.3
PV PEAK GRADIENT: 4 MMHG
RIGHT VENTRICLE ID DIMENSION: 3 CM
SL CV LEFT ATRIUM LENGTH A2C: 5.2 CM
SL CV PED ECHO LEFT VENTRICLE DIASTOLIC VOLUME (MOD BIPLANE) 2D: 95 ML
SL CV PED ECHO LEFT VENTRICLE SYSTOLIC VOLUME (MOD BIPLANE) 2D: 38 ML

## 2022-08-22 PROCEDURE — 93306 TTE W/DOPPLER COMPLETE: CPT | Performed by: INTERNAL MEDICINE

## 2022-08-22 PROCEDURE — 93306 TTE W/DOPPLER COMPLETE: CPT

## 2022-08-23 ENCOUNTER — TELEPHONE (OUTPATIENT)
Dept: CARDIOLOGY CLINIC | Facility: CLINIC | Age: 57
End: 2022-08-23

## 2022-08-23 NOTE — TELEPHONE ENCOUNTER
----- Message from Rafal Randhawa MD sent at 8/22/2022  2:47 PM EDT -----  Please call and inform the patient that the Echocardiogram showed normal pumping function of the heart      There is mild prolapse of the mitral valve leaflet with mild to moderate regurgitation  Periodic follow-up echocardiogram every 1-2 years is recommended  Will discuss further at next office visit

## 2022-09-13 ENCOUNTER — NURSE TRIAGE (OUTPATIENT)
Dept: OTHER | Facility: OTHER | Age: 57
End: 2022-09-13

## 2022-09-13 ENCOUNTER — TELEPHONE (OUTPATIENT)
Dept: GASTROENTEROLOGY | Facility: CLINIC | Age: 57
End: 2022-09-13

## 2022-09-13 NOTE — TELEPHONE ENCOUNTER
Called and spoke to pt to inform of using dulcolax tabs instead of the mag citrate  Emailed her the updated instructions   For Miralax w/ mag & dul

## 2022-09-13 NOTE — TELEPHONE ENCOUNTER
----- Message from Parmjit Lay RN sent at 9/13/2022  8:55 AM EDT -----  Regarding: FW: Prep for colonoscopy scheduled for 9/14    ----- Message -----  From: Nena Ramos  Sent: 9/12/2022   6:35 PM EDT  To: , #  Subject: Prep for colonoscopy scheduled for 9/14          I can not find magnesium citrate anywhere the pharmacy said it was on back orders there was a recall no one has it , I do have the miralax and Gatorade   Or we can go a head with the endoscopy and reschedule the colonoscopy you call me at [phone]

## 2022-09-13 NOTE — TELEPHONE ENCOUNTER
Regarding: colonoscopy prep issue  ----- Message from Melodie De Jesus sent at 9/13/2022  8:57 AM EDT -----  "I have a colonoscopy tomorrow and I need to know what I should take instead of the mag citrate "

## 2022-09-14 ENCOUNTER — HOSPITAL ENCOUNTER (OUTPATIENT)
Dept: GASTROENTEROLOGY | Facility: HOSPITAL | Age: 57
Setting detail: OUTPATIENT SURGERY
Discharge: HOME | End: 2022-09-14
Attending: INTERNAL MEDICINE
Payer: COMMERCIAL

## 2022-09-14 ENCOUNTER — ANESTHESIA EVENT (OUTPATIENT)
Dept: GASTROENTEROLOGY | Facility: HOSPITAL | Age: 57
End: 2022-09-14

## 2022-09-14 ENCOUNTER — ANESTHESIA (OUTPATIENT)
Dept: GASTROENTEROLOGY | Facility: HOSPITAL | Age: 57
End: 2022-09-14

## 2022-09-14 VITALS
SYSTOLIC BLOOD PRESSURE: 116 MMHG | HEART RATE: 64 BPM | HEIGHT: 63 IN | RESPIRATION RATE: 15 BRPM | TEMPERATURE: 98.1 F | BODY MASS INDEX: 30.62 KG/M2 | WEIGHT: 172.8 LBS | DIASTOLIC BLOOD PRESSURE: 65 MMHG | OXYGEN SATURATION: 93 %

## 2022-09-14 DIAGNOSIS — Z12.11 ENCOUNTER FOR SCREENING COLONOSCOPY: ICD-10-CM

## 2022-09-14 DIAGNOSIS — K21.9 GASTROESOPHAGEAL REFLUX DISEASE WITHOUT ESOPHAGITIS: ICD-10-CM

## 2022-09-14 PROCEDURE — 45385 COLONOSCOPY W/LESION REMOVAL: CPT | Performed by: INTERNAL MEDICINE

## 2022-09-14 PROCEDURE — 88305 TISSUE EXAM BY PATHOLOGIST: CPT | Performed by: PATHOLOGY

## 2022-09-14 PROCEDURE — 43239 EGD BIOPSY SINGLE/MULTIPLE: CPT | Performed by: INTERNAL MEDICINE

## 2022-09-14 RX ORDER — PANTOPRAZOLE SODIUM 40 MG/1
40 TABLET, DELAYED RELEASE ORAL DAILY
Qty: 30 TABLET | Refills: 2 | Status: SHIPPED | OUTPATIENT
Start: 2022-09-14 | End: 2022-10-12 | Stop reason: SDUPTHER

## 2022-09-14 RX ORDER — LIDOCAINE HYDROCHLORIDE 20 MG/ML
INJECTION, SOLUTION EPIDURAL; INFILTRATION; INTRACAUDAL; PERINEURAL AS NEEDED
Status: DISCONTINUED | OUTPATIENT
Start: 2022-09-14 | End: 2022-09-14

## 2022-09-14 RX ORDER — PSEUDOEPHEDRINE HCL 30 MG
30 TABLET ORAL EVERY 4 HOURS PRN
COMMUNITY

## 2022-09-14 RX ORDER — PROPOFOL 10 MG/ML
INJECTION, EMULSION INTRAVENOUS AS NEEDED
Status: DISCONTINUED | OUTPATIENT
Start: 2022-09-14 | End: 2022-09-14

## 2022-09-14 RX ADMIN — PROPOFOL 50 MG: 10 INJECTION, EMULSION INTRAVENOUS at 11:12

## 2022-09-14 RX ADMIN — LIDOCAINE HYDROCHLORIDE 100 MG: 20 INJECTION, SOLUTION EPIDURAL; INFILTRATION; INTRACAUDAL at 11:00

## 2022-09-14 RX ADMIN — PROPOFOL 50 MG: 10 INJECTION, EMULSION INTRAVENOUS at 11:16

## 2022-09-14 RX ADMIN — PROPOFOL 100 MG: 10 INJECTION, EMULSION INTRAVENOUS at 11:00

## 2022-09-14 RX ADMIN — PROPOFOL 50 MG: 10 INJECTION, EMULSION INTRAVENOUS at 11:03

## 2022-09-14 RX ADMIN — PROPOFOL 50 MG: 10 INJECTION, EMULSION INTRAVENOUS at 11:08

## 2022-09-14 RX ADMIN — PROPOFOL 50 MG: 10 INJECTION, EMULSION INTRAVENOUS at 11:20

## 2022-09-14 NOTE — ANESTHESIA POSTPROCEDURE EVALUATION
Post-Op Assessment Note    CV Status:  Stable  Pain Score: 0    Pain management: adequate     Mental Status:  Awake   Hydration Status:  Stable   PONV Controlled:  Controlled   Airway Patency:  Patent      Post Op Vitals Reviewed: Yes      Staff: CRNA         No complications documented      BP   133/60   Temp      Pulse  81   Resp   12   SpO2   98

## 2022-09-14 NOTE — H&P
History and Physical -  [facility]  Corie Mccabe 61 y o  female MRN: [id number]                  HPI: Corie Mccabe is a 61y o  year old female who presents for chronic GERD and screening colonoscopy      REVIEW OF SYSTEMS: Per the HPI, and otherwise unremarkable  Historical Information   Past Medical History:   Diagnosis Date    Allergic rhinitis     Resolved 2017     Chronic sinusitis     Resolved 2017     COVID-19 01/10/2022    Emphysema lung (HCC)     Empyema (HCC)     GERD (gastroesophageal reflux disease)     Mitral valve prolapse     Palpitations     Last assessed 2006     Skin disorder     Resolved 2017     Skin lesions     Resolved 2017      Past Surgical History:   Procedure Laterality Date    ENDOMETRIAL ABLATION      LIPOMA RESECTION      arm    TUBAL LIGATION       Social History   Social History     Substance and Sexual Activity   Alcohol Use Not Currently     Social History     Substance and Sexual Activity   Drug Use Never     Social History     Tobacco Use   Smoking Status Current Every Day Smoker    Packs/day: 0 50    Years: 45 00    Pack years: 22 50    Types: Cigarettes    Last attempt to quit: 2021    Years since quittin 7   Smokeless Tobacco Never Used   Tobacco Comment    Current smoker - As per [employer]     Family History   Problem Relation Age of Onset    Mitral valve prolapse Father     Heart disease Father         open heart surgery    Stroke Father     Breast cancer Sister     Uterine cancer Paternal Grandmother     Colon cancer Paternal Grandfather        Meds/Allergies     (Not in a hospital admission)      Allergies   Allergen Reactions    Diphenhydramine Hives     Reaction Date: 2005;     Penicillins Hives     Reaction Date: 2005;         Objective     /58   Pulse 91   Temp 97 7 °F (36 5 °C) (Temporal)   Resp 20   Ht 5' 3" (1 6 m)   Wt 78 4 kg (172 lb 12 8 oz)   SpO2 96% Breastfeeding No   BMI 30 61 kg/m²       PHYSICAL EXAM    Gen: NAD  CV: RRR  CHEST: Clear  ABD: soft, NT/ND  EXT: no edema      ASSESSMENT/PLAN:  This is a 61y o  year old female here for EGD and colonoscopy, and she is stable and optimized for her procedure

## 2022-09-14 NOTE — ANESTHESIA PREPROCEDURE EVALUATION
Procedure:  COLONOSCOPY  EGD    Relevant Problems   CARDIO   (+) Extrasystole   (+) Scapulalgia      MUSCULOSKELETAL   (+) Low back pain      NEURO/PSYCH   (+) Generalized anxiety disorder      PULMONARY   (+) Centrilobular emphysema (HCC)   (+) Tobacco smoker, less than 10 cigarettes per day        Physical Exam    Airway    Mallampati score: II  TM Distance: >3 FB  Neck ROM: full     Dental   No notable dental hx     Cardiovascular  Rhythm: regular, Rate: normal,     Pulmonary  Breath sounds clear to auscultation,     Other Findings  Intercisor Distance > 3cm          Anesthesia Plan  ASA Score- 3     Anesthesia Type- IV sedation with anesthesia with ASA Monitors  Additional Monitors:   Airway Plan:     Comment: NPO appropriate  Discussed benefits/risks of monitored anesthetic care which involves providing a dynamic level of mild to deep sedation  Complications include awareness and/or airway obstruction/aspiration which may necessitate conversion to general anesthesia  All questions answered  Patient understands and wishes to proceed          Plan Factors-Exercise tolerance (METS): >4 METS  Chart reviewed  EKG reviewed  Existing labs reviewed  Induction-     Postoperative Plan- Plan for postoperative opioid use  Informed Consent- Anesthetic plan and risks discussed with patient  I personally reviewed this patient with the CRNA  Discussed and agreed on the Anesthesia Plan with the CRNA  Jhoana Shirley

## 2022-09-19 PROCEDURE — 88305 TISSUE EXAM BY PATHOLOGIST: CPT | Performed by: PATHOLOGY

## 2022-10-06 ENCOUNTER — OFFICE VISIT (OUTPATIENT)
Dept: FAMILY MEDICINE CLINIC | Facility: CLINIC | Age: 57
End: 2022-10-06
Payer: COMMERCIAL

## 2022-10-06 VITALS
SYSTOLIC BLOOD PRESSURE: 110 MMHG | DIASTOLIC BLOOD PRESSURE: 64 MMHG | HEART RATE: 86 BPM | RESPIRATION RATE: 16 BRPM | BODY MASS INDEX: 31.18 KG/M2 | OXYGEN SATURATION: 98 % | HEIGHT: 63 IN | TEMPERATURE: 97.4 F | WEIGHT: 176 LBS

## 2022-10-06 DIAGNOSIS — B02.9 HERPES ZOSTER WITHOUT COMPLICATION: Primary | ICD-10-CM

## 2022-10-06 DIAGNOSIS — Z12.31 ENCOUNTER FOR SCREENING MAMMOGRAM FOR MALIGNANT NEOPLASM OF BREAST: ICD-10-CM

## 2022-10-06 PROCEDURE — 99214 OFFICE O/P EST MOD 30 MIN: CPT | Performed by: FAMILY MEDICINE

## 2022-10-06 RX ORDER — VALACYCLOVIR HYDROCHLORIDE 1 G/1
1000 TABLET, FILM COATED ORAL 3 TIMES DAILY
Qty: 21 TABLET | Refills: 0 | Status: SHIPPED | OUTPATIENT
Start: 2022-10-06 | End: 2022-10-13

## 2022-10-06 RX ORDER — LIDOCAINE HYDROCHLORIDE 20 MG/ML
JELLY TOPICAL AS NEEDED
Qty: 30 ML | Refills: 0 | Status: SHIPPED | OUTPATIENT
Start: 2022-10-06

## 2022-10-06 NOTE — LETTER
October 6, 2022     Patient: Lupe Portillo  YOB: 1965  Date of Visit: 10/6/2022      To Whom it May Concern:    Lupe Portillo is under my professional care  Yesenia Hartman was seen in my office on 10/6/2022  Yesenia Hartman may return to work on 10/7/2022  If you have any questions or concerns, please don't hesitate to call           Sincerely,          Opal Hernandez MD        CC: No Recipients

## 2022-10-06 NOTE — PROGRESS NOTES
Name: Gerda Tran      : 1965      MRN: [id number]  Encounter Provider: Jade Barnett MD  Encounter Date: 10/6/2022   Encounter department: [address] [place] [address]     1  Herpes zoster without complication  -     valACYclovir (VALTREX) 1,000 mg tablet; Take 1 tablet (1,000 mg total) by mouth 3 (three) times a day for 7 days  -     lidocaine (XYLOCAINE) 2 % topical gel; Apply topically as needed for mild pain    2  Encounter for screening mammogram for malignant neoplasm of breast  -     Mammo screening bilateral w 3d & cad; Future; Expected date: 10/06/2022      Shingles   WHAT YOU NEED TO KNOW:   Shingles is a painful rash  Shingles is caused by the same virus that causes chickenpox (varicella-zoster)  After you get chickenpox, the virus stays in your body for several years without causing any symptoms  Shingles occurs when the virus becomes active again  The active virus travels along a nerve to your skin and causes a rash  DISCHARGE INSTRUCTIONS:   Call your local emergency number (911 in the [address] [place] [address],[address]) if:   You have trouble moving your arms, legs, or face  You become confused, or have difficulty speaking  You have a seizure  Return to the emergency department if:   You have weakness in an arm or leg  You have dizziness, a severe headache, or hearing or vision loss  You have painful, red, warm skin around the blisters, or the blisters drain pus  Your neck is stiff or you have trouble moving it  Call your doctor if:   You feel weak or have a headache  You have a cough, chills, or a fever  You have abdominal pain or nausea, or you are vomiting  Your rash becomes more itchy or painful  Your rash spreads to other parts of your body  Your pain worsens and does not go away even after you take medicine  You have questions or concerns about your condition or care  Medicines:   You may need any of the following:  Antiviral medicine  helps decrease symptoms and healing time  They may also decrease your risk of developing nerve pain  You will need to start taking them within 3 days of the start of symptoms to prevent nerve pain  Prescription pain medicine  may be given  Ask your healthcare provider how to take this medicine safely  Some prescription pain medicines contain acetaminophen  Do not take other medicines that contain acetaminophen without talking to your healthcare provider  Too much acetaminophen may cause liver damage  Prescription pain medicine may cause constipation  Ask your healthcare provider how to prevent or treat constipation  Acetaminophen  decreases pain and fever  It is available without a doctor's order  Ask how much to take and how often to take it  Follow directions  Read the labels of all other medicines you are using to see if they also contain acetaminophen, or ask your doctor or pharmacist  Acetaminophen can cause liver damage if not taken correctly  Do not use more than 4 grams (4,000 milligrams) total of acetaminophen in one day  NSAIDs , such as ibuprofen, help decrease swelling, pain, and fever  This medicine is available with or without a doctor's order  NSAIDs can cause stomach bleeding or kidney problems in certain people  If you take blood thinner medicine, always ask if NSAIDs are safe for you  Always read the medicine label and follow directions  Do not give these medicines to children under 10months of age without direction from your child's healthcare provider  Topical anesthetics  are used to numb the skin and decrease pain  They can be a cream, gel, spray, or patch  Anticonvulsants  decrease nerve pain and may help you sleep at night  Antidepressants  may be used to decrease nerve pain  Take your medicine as directed  Contact your healthcare provider if you think your medicine is not helping or if you have side effects  Tell him of her if you are allergic to any medicine   Keep a list of the medicines, vitamins, and herbs you take  Include the amounts, and when and why you take them  Bring the list or the pill bottles to follow-up visits  Carry your medicine list with you in case of an emergency  Self-care:  Keep your rash clean and dry  Cover your rash with a bandage or clothing  Do not use bandages that stick to your skin  The sticky part may irritate your skin and make your rash last longer  Prevent the spread of germs:       Wash your hands often  Wash your hands several times each day  Wash after you use the bathroom, change a child's diaper, and before you prepare or eat food  Use soap and water every time  Rub your soapy hands together, lacing your fingers  Wash the front and back of your hands, and in between your fingers  Use the fingers of one hand to scrub under the fingernails of the other hand  Wash for at least 20 seconds  Rinse with warm, running water for several seconds  Then dry your hands with a clean towel or paper towel  Use hand  that contains alcohol if soap and water are not available  Do not touch your eyes, nose, or mouth without washing your hands first          Cover a sneeze or cough  Use a tissue that covers your mouth and nose  Throw the tissue away in a trash can right away  Use the bend of your arm if a tissue is not available  Wash your hands well with soap and water or use a hand   Stay away from others while you are sick  Avoid crowds as much as possible  Ask about vaccines you may need  Talk to your healthcare provider about your vaccine history  He or she will tell you which vaccines you need, and when to get them  Prevent shingles or another shingles outbreak:  A vaccine may be given to help prevent shingles  You can get the vaccine even if you already had shingles  The vaccine can help prevent a future outbreak  If you do get shingles again, the vaccine can keep it from becoming severe  The vaccine comes in 2 forms   Your healthcare provider will tell you which form is right for you  The decision is based on your age and any medical conditions you have  A 2-dose vaccine is usually given to adults 48 years or older  A 1-dose vaccine may be given to adults 61 years or older  Follow up with your doctor as directed:  Write down your questions so you remember to ask them during your visits  For more information:   [facility]  [address] , [address]  Phone: [phone]  Phone: [phone]  Web Address: [employer]     Subjective      Rash  This is a new problem  The current episode started yesterday  The problem has been gradually worsening since onset  Location: right side of her trunk (around right bra line) The rash is characterized by burning, pain and redness  She was exposed to nothing  (Reports associated headache and myalgias  ) Past treatments include nothing  Review of Systems   Constitutional: Negative  HENT: Negative  Eyes: Negative  Respiratory: Negative  Cardiovascular: Negative  Gastrointestinal: Negative  Endocrine: Negative  Genitourinary: Negative  Musculoskeletal: Negative  Skin: Positive for rash  Allergic/Immunologic: Negative  Neurological: Negative  Hematological: Negative  Psychiatric/Behavioral: Negative          Current Outpatient Medications on File Prior to Visit   Medication Sig    albuterol (ProAir HFA) 90 mcg/act inhaler Inhale 2 puffs every 4 (four) hours as needed for wheezing    famotidine-calcium carbonate-magnesium hydroxide (Pepcid Complete) -165 MG CHEW Chew    fexofenadine (ALLEGRA) 180 MG tablet Take 180 mg by mouth daily    nicotine (NICOTROL) 10 MG inhaler Use 1 cartridge per hour as needed do not exceed 10 per day    pantoprazole (PROTONIX) 40 mg tablet Take 1 tablet (40 mg total) by mouth daily    pseudoephedrine (SUDAFED) 30 mg tablet Take 30 mg by mouth every 4 (four) hours as needed for congestion    Stiolto Respimat 2 5-2 5 MCG/ACT inhaler Inhale 2 puffs daily       Objective     /64   Pulse 86   Temp (!) 97 4 °F (36 3 °C)   Resp 16   Ht 5' 3" (1 6 m)   Wt 79 8 kg (176 lb)   SpO2 98%   BMI 31 18 kg/m²     Physical Exam  Constitutional:       General: She is not in acute distress  Appearance: She is well-developed  She is not diaphoretic  HENT:      Head: Normocephalic and atraumatic  Eyes:      General: No scleral icterus  Right eye: No discharge  Left eye: No discharge  Conjunctiva/sclera: Conjunctivae normal    Pulmonary:      Effort: Pulmonary effort is normal    Musculoskeletal:      Cervical back: Normal range of motion  Skin:     General: Skin is warm  Findings: Rash (approx 4 erythematous raised lesions around right side of bra line) present  Neurological:      Mental Status: She is alert and oriented to person, place, and time  Psychiatric:         Behavior: Behavior normal          Thought Content:  Thought content normal          Judgment: Judgment normal        Celestina Edwards MD

## 2022-10-06 NOTE — PATIENT INSTRUCTIONS
Shingles   WHAT YOU NEED TO KNOW:   Shingles is a painful rash  Shingles is caused by the same virus that causes chickenpox (varicella-zoster)  After you get chickenpox, the virus stays in your body for several years without causing any symptoms  Shingles occurs when the virus becomes active again  The active virus travels along a nerve to your skin and causes a rash  DISCHARGE INSTRUCTIONS:   Call your local emergency number (911 in the [address] [facility],[address]) if:   You have trouble moving your arms, legs, or face  You become confused, or have difficulty speaking  You have a seizure  Return to the emergency department if:   You have weakness in an arm or leg  You have dizziness, a severe headache, or hearing or vision loss  You have painful, red, warm skin around the blisters, or the blisters drain pus  Your neck is stiff or you have trouble moving it  Call your doctor if:   You feel weak or have a headache  You have a cough, chills, or a fever  You have abdominal pain or nausea, or you are vomiting  Your rash becomes more itchy or painful  Your rash spreads to other parts of your body  Your pain worsens and does not go away even after you take medicine  You have questions or concerns about your condition or care  Medicines: You may need any of the following:  Antiviral medicine  helps decrease symptoms and healing time  They may also decrease your risk of developing nerve pain  You will need to start taking them within 3 days of the start of symptoms to prevent nerve pain  Prescription pain medicine  may be given  Ask your healthcare provider how to take this medicine safely  Some prescription pain medicines contain acetaminophen  Do not take other medicines that contain acetaminophen without talking to your healthcare provider  Too much acetaminophen may cause liver damage  Prescription pain medicine may cause constipation   Ask your healthcare provider how to prevent or treat constipation  Acetaminophen  decreases pain and fever  It is available without a doctor's order  Ask how much to take and how often to take it  Follow directions  Read the labels of all other medicines you are using to see if they also contain acetaminophen, or ask your doctor or pharmacist  Acetaminophen can cause liver damage if not taken correctly  Do not use more than 4 grams (4,000 milligrams) total of acetaminophen in one day  NSAIDs , such as ibuprofen, help decrease swelling, pain, and fever  This medicine is available with or without a doctor's order  NSAIDs can cause stomach bleeding or kidney problems in certain people  If you take blood thinner medicine, always ask if NSAIDs are safe for you  Always read the medicine label and follow directions  Do not give these medicines to children under 10months of age without direction from your child's healthcare provider  Topical anesthetics  are used to numb the skin and decrease pain  They can be a cream, gel, spray, or patch  Anticonvulsants  decrease nerve pain and may help you sleep at night  Antidepressants  may be used to decrease nerve pain  Take your medicine as directed  Contact your healthcare provider if you think your medicine is not helping or if you have side effects  Tell him of her if you are allergic to any medicine  Keep a list of the medicines, vitamins, and herbs you take  Include the amounts, and when and why you take them  Bring the list or the pill bottles to follow-up visits  Carry your medicine list with you in case of an emergency  Self-care:  Keep your rash clean and dry  Cover your rash with a bandage or clothing  Do not use bandages that stick to your skin  The sticky part may irritate your skin and make your rash last longer  Prevent the spread of germs:       Wash your hands often  Wash your hands several times each day   Wash after you use the bathroom, change a child's diaper, and before you prepare or eat food  Use soap and water every time  Rub your soapy hands together, lacing your fingers  Wash the front and back of your hands, and in between your fingers  Use the fingers of one hand to scrub under the fingernails of the other hand  Wash for at least 20 seconds  Rinse with warm, running water for several seconds  Then dry your hands with a clean towel or paper towel  Use hand  that contains alcohol if soap and water are not available  Do not touch your eyes, nose, or mouth without washing your hands first          Cover a sneeze or cough  Use a tissue that covers your mouth and nose  Throw the tissue away in a trash can right away  Use the bend of your arm if a tissue is not available  Wash your hands well with soap and water or use a hand   Stay away from others while you are sick  Avoid crowds as much as possible  Ask about vaccines you may need  Talk to your healthcare provider about your vaccine history  He or she will tell you which vaccines you need, and when to get them  Prevent shingles or another shingles outbreak:  A vaccine may be given to help prevent shingles  You can get the vaccine even if you already had shingles  The vaccine can help prevent a future outbreak  If you do get shingles again, the vaccine can keep it from becoming severe  The vaccine comes in 2 forms  Your healthcare provider will tell you which form is right for you  The decision is based on your age and any medical conditions you have  A 2-dose vaccine is usually given to adults 48 years or older  A 1-dose vaccine may be given to adults 61 years or older  Follow up with your doctor as directed:  Write down your questions so you remember to ask them during your visits    For more information:   [facility]  [address] , [address]  Phone: [phone]  Phone: [phone]- [phone]  Web Address: [employer]     © [facility] 2022 Information is for End User's use only and may not be sold, redistributed or otherwise used for commercial purposes  All illustrations and images included in CareNotes® are the copyrighted property of A D A M , Inc  or [employer]  The above information is an  only  It is not intended as medical advice for individual conditions or treatments  Talk to your doctor, nurse or pharmacist before following any medical regimen to see if it is safe and effective for you

## 2022-10-12 DIAGNOSIS — J43.2 CENTRILOBULAR EMPHYSEMA (HCC): Primary | ICD-10-CM

## 2022-10-12 DIAGNOSIS — K21.9 GASTROESOPHAGEAL REFLUX DISEASE WITHOUT ESOPHAGITIS: ICD-10-CM

## 2022-10-12 RX ORDER — PANTOPRAZOLE SODIUM 40 MG/1
40 TABLET, DELAYED RELEASE ORAL DAILY
Qty: 30 TABLET | Refills: 0 | Status: SHIPPED | OUTPATIENT
Start: 2022-10-12

## 2022-10-12 RX ORDER — BUDESONIDE, GLYCOPYRROLATE, AND FORMOTEROL FUMARATE 160; 9; 4.8 UG/1; UG/1; UG/1
2 AEROSOL, METERED RESPIRATORY (INHALATION) 2 TIMES DAILY
Qty: 10.7 G | Refills: 7 | Status: SHIPPED | OUTPATIENT
Start: 2022-10-12

## 2022-10-12 NOTE — PROGRESS NOTES
Has some intermittent wheeze or shortness  Uses stiolto and willing to try Breztri    I sent Rx to [facility]

## 2022-11-22 ENCOUNTER — HOSPITAL ENCOUNTER (OUTPATIENT)
Dept: RADIOLOGY | Facility: HOSPITAL | Age: 57
Discharge: HOME | End: 2022-11-22
Attending: FAMILY MEDICINE

## 2022-11-22 VITALS — WEIGHT: 176 LBS | HEIGHT: 63 IN | BODY MASS INDEX: 31.18 KG/M2

## 2022-11-22 DIAGNOSIS — Z12.31 ENCOUNTER FOR SCREENING MAMMOGRAM FOR MALIGNANT NEOPLASM OF BREAST: ICD-10-CM

## 2022-11-29 DIAGNOSIS — Z91.89 AT HIGH RISK FOR BREAST CANCER: Primary | ICD-10-CM

## 2022-12-29 NOTE — PROGRESS NOTES
Progress Note - Cardiology Office  [facility]    Gnea Camejo 61 y o  female MRN: [id number]  : 1965  Encounter: [id number]      ASSESSMENT:  H/o MVP,   diagnosed more than 10 years ago  Has not had any cardiology follow-up  No significant MR murmur detected on auscultation  As per patient to other family members also have mitral valve disease/prolapse in her family and some of them have required intervention    TTE, 2022:  EF 65%, mild LAE, mild posterior MV leaflet prolapse, mild to moderate MR, mild TR    GERD     COPD/emphysema     Tobacco smoker, trying to quit,      Obesity, BMI 32 06        RECOMMENDATIONS:  Low-salt diet  Regular cardiovascular exercise as tolerated  Weight loss  Advised to quit smoking  Advised patient to monitor her blood pressure and if her systolic is consistently more than 130 mmHg, to call us and we will probably start her on low-dose ACE inhibitor/ARB           Please call [phone] if any questions  HPI :     Gena Camejo is a 61y o  year old female who came for follow up  She is generally doing well and denies any cardiac symptoms  She is still trying to quit smoking  I informed her that her blood pressure is slightly elevated today  She attributes this to rushing to come to our office and slight anxiety on trying to quit smoking    REVIEW OF SYSTEMS:  Nuys any new or acute cardiac symptoms  Denies chest pain, unusual dyspnea, palpitations or syncope      Historical Information   Past Medical History:   Diagnosis Date   • Allergic rhinitis     Resolved 2017    • BRCA1 negative    • Chronic sinusitis     Resolved 2017    • COVID-19 01/10/2022   • Emphysema lung ([facility] [address] )    • Empyema ([facility] [address] )    • GERD (gastroesophageal reflux disease)    • Mitral valve prolapse    • Palpitations     Last assessed 2006    • Skin disorder     Resolved 2017    • Skin lesions     Resolved 2017      Past Surgical History:   Procedure Laterality Date   • BREAST BIOPSY Bilateral     beningn   • ENDOMETRIAL ABLATION     • LIPOMA RESECTION      arm   • TUBAL LIGATION       Social History     Substance and Sexual Activity   Alcohol Use Not Currently     Social History     Substance and Sexual Activity   Drug Use Never     Social History     Tobacco Use   Smoking Status Every Day   • Packs/day: 0 50   • Years: 45 00   • Pack years: 22 50   • Types: Cigarettes   • Last attempt to quit: 2021   • Years since quittin 0   Smokeless Tobacco Never   Tobacco Comments    Current smoker - As per [facility]     Family History:   Family History   Problem Relation Age of Onset   • Mitral valve prolapse Father    • Heart disease Father         open heart surgery   • Stroke Father    • Breast cancer Sister 43   • Uterine cancer Paternal Grandmother    • Colon cancer Paternal Grandfather        Meds/Allergies     Allergies   Allergen Reactions   • Diphenhydramine Hives     Reaction Date: 2005;    • Penicillins Hives     Reaction Date: 2005;        Current Outpatient Medications:   •  albuterol (ProAir HFA) 90 mcg/act inhaler, Inhale 2 puffs every 4 (four) hours as needed for wheezing, Disp: 8 5 g, Rfl: 6  •  Budeson-Glycopyrrol-Formoterol (Breztri Aerosphere) 160-9-4 8 MCG/ACT AERO, Inhale 2 puffs 2 (two) times a day Rinse mouth after use , Disp: 10 7 g, Rfl: 7  •  fexofenadine (ALLEGRA) 180 MG tablet, Take 180 mg by mouth daily, Disp: , Rfl:   •  nicotine (NICOTROL) 10 MG inhaler, Use 1 cartridge per hour as needed do not exceed 10 per day, Disp: 168 each, Rfl: 3  •  pantoprazole (PROTONIX) 40 mg tablet, Take 1 tablet (40 mg total) by mouth daily, Disp: 30 tablet, Rfl: 0  •  pseudoephedrine (SUDAFED) 30 mg tablet, Take 30 mg by mouth every 4 (four) hours as needed for congestion, Disp: , Rfl:   •  famotidine-calcium carbonate-magnesium hydroxide (Pepcid Complete) -165 MG CHEW, Chew, Disp: , Rfl:   •  lidocaine (XYLOCAINE) 2 % topical gel, Apply topically as needed for mild pain, Disp: 30 mL, Rfl: 0  •  valACYclovir (VALTREX) 1,000 mg tablet, Take 1 tablet (1,000 mg total) by mouth 3 (three) times a day for 7 days, Disp: 21 tablet, Rfl: 0    Vitals: Blood pressure 138/70, pulse 75, temperature 97 6 °F (36 4 °C), height 5' 3" (1 6 m), weight 82 1 kg (181 lb), SpO2 98 %, not currently breastfeeding  ?  Body mass index is 32 06 kg/m²  Vitals:    12/30/22 1122   Weight: 82 1 kg (181 lb)     BP Readings from Last 3 Encounters:   12/30/22 138/70   10/06/22 110/64   09/14/22 116/65       Physical Exam:    Neurologic:  Alert & oriented x 3, no new focal deficits, Not in any acute distress,  Constitutional: Obese  Eyes:  Pupil equal and reacting to light, conjunctiva normal,   HENT:  Atraumatic, oropharynx moist, Neck- normal range of motion, no tenderness,  Neck supple, No JVP, No LNP   Respiratory:  Bilateral air entry, mostly clear to auscultation  Cardiovascular: S1-S2 regular with a I/VI ejection systolic murmur   GI:  Soft, nondistended, normal bowel sounds, nontender, no hepatosplenomegaly appreciated  Musculoskeletal: no tenderness, no deformities  Skin:  Well hydrated, no rash   Lymphatic:  No lymphadenopathy noted   Extremities:  No edema and distal pulses are present        Diagnostic Studies Review Cardio:      EKG: Normal sinus rhythm, heart rate 75/min    Cardiac testing:     Results for orders placed during the hospital encounter of 08/22/22    Echo complete w/ contrast if indicated    Interpretation Summary  •  Left Ventricle: Left ventricular cavity size is normal  Wall thickness is mildly increased  Systolic function is normal   Estimated ejection fraction is 65%  Wall motion is normal  Diastolic function is normal   •  Right Ventricle: Systolic function appear normal   •  Left Atrium: The atrium is mildly dilated  •  Mitral Valve: The mitral valve was not well visualized   No mitral valve prolapse was appreciated on the 2D images in parasternal long axis  M-mode displayed possible mild prolapse of the posterior leaflet  There is mild to moderate regurgitation  •  Tricuspid Valve: There is mild regurgitation  There is no indirect evidence of pulmonary hypertension  Imaging:  Chest X-Ray:   No Chest XR results available for this patient  CT-scan of the chest:     No CTA results available for this patient  Lab Review   Lab Results   Component Value Date    WBC 7 74 01/21/2022    HGB 12 7 01/21/2022    HCT 40 5 01/21/2022    MCV 92 01/21/2022    RDW 13 2 01/21/2022     01/21/2022     BMP:  Lab Results   Component Value Date    SODIUM 138 01/21/2022    K 3 8 01/21/2022     01/21/2022    CO2 26 01/21/2022    BUN 17 01/21/2022    CREATININE 0 89 01/21/2022    GLUC 115 01/21/2022    CALCIUM 9 3 01/21/2022    CORRECTEDCA 9 9 01/21/2022    EGFR 72 01/21/2022     LFT:  Lab Results   Component Value Date    AST 12 01/21/2022    ALT 28 01/21/2022    ALKPHOS 80 01/21/2022    TP 6 8 01/21/2022    ALB 3 3 (L) 01/21/2022      No components found for: TSH3  No results found for: [id number]  No results found for: HGBA1C  Lipid Profile:   No results found for: CHOLESTEROL, HDL, LDLCALC, TRIG  No results found for: CHOLESTEROL  No results found for: CKTOTAL, CKMB, CKMBINDEX, TROPONINI  No results found for: NTBNP   No results found for this or any previous visit (from the past 672 hour(s))  Dr Val Denis MD, [facility]      "This note has been constructed using a voice recognition system  Therefore there may be syntax, spelling, and/or grammatical errors   Please call if you have any questions  "

## 2022-12-30 ENCOUNTER — OFFICE VISIT (OUTPATIENT)
Dept: CARDIOLOGY CLINIC | Facility: CLINIC | Age: 57
End: 2022-12-30

## 2022-12-30 VITALS
HEART RATE: 75 BPM | OXYGEN SATURATION: 98 % | BODY MASS INDEX: 32.07 KG/M2 | SYSTOLIC BLOOD PRESSURE: 138 MMHG | HEIGHT: 63 IN | DIASTOLIC BLOOD PRESSURE: 70 MMHG | WEIGHT: 181 LBS | TEMPERATURE: 97.6 F

## 2022-12-30 DIAGNOSIS — I05.9 MITRAL VALVE DISORDER: Primary | ICD-10-CM

## 2023-01-06 ENCOUNTER — HOSPITAL ENCOUNTER (OUTPATIENT)
Dept: RADIOLOGY | Facility: HOSPITAL | Age: 58
Discharge: HOME | End: 2023-01-06

## 2023-01-06 VITALS — HEIGHT: 63 IN | WEIGHT: 181 LBS | BODY MASS INDEX: 32.07 KG/M2

## 2023-01-06 DIAGNOSIS — Z91.89 AT HIGH RISK FOR BREAST CANCER: ICD-10-CM

## 2023-01-06 RX ADMIN — GADOBUTROL 8 ML: 604.72 INJECTION INTRAVENOUS at 14:52

## 2023-01-20 DIAGNOSIS — K21.9 GASTROESOPHAGEAL REFLUX DISEASE WITHOUT ESOPHAGITIS: ICD-10-CM

## 2023-01-20 RX ORDER — PANTOPRAZOLE SODIUM 40 MG/1
TABLET, DELAYED RELEASE ORAL
Qty: 30 TABLET | Refills: 0 | Status: SHIPPED | OUTPATIENT
Start: 2023-01-20

## 2023-02-07 ENCOUNTER — OFFICE VISIT (OUTPATIENT)
Dept: FAMILY MEDICINE CLINIC | Facility: CLINIC | Age: 58
End: 2023-02-07

## 2023-02-07 VITALS
BODY MASS INDEX: 31.89 KG/M2 | HEIGHT: 63 IN | SYSTOLIC BLOOD PRESSURE: 140 MMHG | DIASTOLIC BLOOD PRESSURE: 64 MMHG | WEIGHT: 180 LBS | RESPIRATION RATE: 16 BRPM | OXYGEN SATURATION: 96 % | HEART RATE: 97 BPM | TEMPERATURE: 97.9 F

## 2023-02-07 DIAGNOSIS — J43.2 CENTRILOBULAR EMPHYSEMA (HCC): ICD-10-CM

## 2023-02-07 DIAGNOSIS — Z72.0 TOBACCO USE: ICD-10-CM

## 2023-02-07 DIAGNOSIS — J01.00 ACUTE NON-RECURRENT MAXILLARY SINUSITIS: Primary | ICD-10-CM

## 2023-02-07 PROBLEM — J32.9 CHRONIC SINUSITIS: Status: ACTIVE | Noted: 2023-02-07

## 2023-02-07 RX ORDER — AZITHROMYCIN 250 MG/1
TABLET, FILM COATED ORAL
Qty: 6 TABLET | Refills: 0 | Status: SHIPPED | OUTPATIENT
Start: 2023-02-07 | End: 2023-02-12

## 2023-02-08 NOTE — PROGRESS NOTES
Assessment/Plan:    No problem-specific Assessment & Plan notes found for this encounter  Uri/early sinusitis  Suggest mucinex DM or equivalent bid but start abx in 2d if fails to continue to improve    Tobacco use risks aware  Encouraged to stop smoking soon    Copd  Continue inhalers, controller and rescue prn  No wheezing today     Diagnoses and all orders for this visit:    Acute non-recurrent maxillary sinusitis  -     azithromycin (ZITHROMAX) 250 mg tablet; Take 500mg on day 1, 250mg on days 2-5    Tobacco use    Centrilobular emphysema (HCC)        Return if symptoms worsen or fail to improve  Subjective:      Patient ID: Randolph Simmons is a 61 y o  female  Chief Complaint   Patient presents with   • Headache   • Sinusitis     Junaid Nair MA    • post nasal drip       HPI  Headache   Congestion  Cough  Sinus pain and pressure  Ear pressure  B/l  Sudafed used  Robitussin DM at hs  Using breztri, used judith yesterday  Thick mucus  About 5d  No fever  Thick and yellow mucus    Recurrent herpes zoster with stress? HSV? The following portions of the patient's history were reviewed and updated as appropriate: allergies, current medications, past family history, past medical history, past social history, past surgical history and problem list     Review of Systems   Respiratory: Positive for cough and shortness of breath  Current Outpatient Medications   Medication Sig Dispense Refill   • albuterol (ProAir HFA) 90 mcg/act inhaler Inhale 2 puffs every 4 (four) hours as needed for wheezing 8 5 g 6   • azithromycin (ZITHROMAX) 250 mg tablet Take 500mg on day 1, 250mg on days 2-5 6 tablet 0   • Budeson-Glycopyrrol-Formoterol (Breztri Aerosphere) 160-9-4 8 MCG/ACT AERO Inhale 2 puffs 2 (two) times a day Rinse mouth after use   10 7 g 7   • fexofenadine (ALLEGRA) 180 MG tablet Take 180 mg by mouth daily     • nicotine (NICOTROL) 10 MG inhaler Use 1 cartridge per hour as needed do not exceed 10 per day 168 each 3   • pantoprazole (PROTONIX) 40 mg tablet TAKE ONE TABLET BY MOUTH ONCE DAILY  30 tablet 0   • pseudoephedrine (SUDAFED) 30 mg tablet Take 30 mg by mouth every 4 (four) hours as needed for congestion       No current facility-administered medications for this visit  Objective:    /64   Pulse 97   Temp 97 9 °F (36 6 °C)   Resp 16   Ht 5' 3" (1 6 m)   Wt 81 6 kg (180 lb)   SpO2 96%   BMI 31 89 kg/m²        Physical Exam  Vitals and nursing note reviewed  Constitutional:       General: She is not in acute distress  Appearance: She is well-developed  She is not ill-appearing  HENT:      Head: Normocephalic  Right Ear: Tympanic membrane normal       Left Ear: Tympanic membrane normal       Nose: Congestion present  Mouth/Throat:      Pharynx: No oropharyngeal exudate  Eyes:      General: No scleral icterus  Right eye: No discharge  Left eye: No discharge  Conjunctiva/sclera: Conjunctivae normal    Cardiovascular:      Rate and Rhythm: Normal rate and regular rhythm  Pulmonary:      Effort: Pulmonary effort is normal  No respiratory distress  Breath sounds: No wheezing  Abdominal:      Palpations: Abdomen is soft  Musculoskeletal:         General: No deformity  Cervical back: Neck supple  Skin:     General: Skin is warm and dry  Coloration: Skin is not pale  Neurological:      Mental Status: She is alert  Motor: No weakness  Gait: Gait normal    Psychiatric:         Mood and Affect: Mood normal          Behavior: Behavior normal          Thought Content:  Thought content normal                 Mario Proper, DO

## 2023-02-13 DIAGNOSIS — K21.9 GASTROESOPHAGEAL REFLUX DISEASE WITHOUT ESOPHAGITIS: ICD-10-CM

## 2023-02-13 RX ORDER — PANTOPRAZOLE SODIUM 40 MG/1
TABLET, DELAYED RELEASE ORAL
Qty: 30 TABLET | Refills: 0 | Status: SHIPPED | OUTPATIENT
Start: 2023-02-13

## 2023-03-15 DIAGNOSIS — K21.9 GASTROESOPHAGEAL REFLUX DISEASE WITHOUT ESOPHAGITIS: ICD-10-CM

## 2023-03-15 RX ORDER — PANTOPRAZOLE SODIUM 40 MG/1
TABLET, DELAYED RELEASE ORAL
Qty: 30 TABLET | Refills: 0 | Status: SHIPPED | OUTPATIENT
Start: 2023-03-15

## 2023-04-08 PROBLEM — J01.00 ACUTE NON-RECURRENT MAXILLARY SINUSITIS: Status: RESOLVED | Noted: 2023-02-07 | Resolved: 2023-04-08

## 2023-05-17 DIAGNOSIS — K21.9 GASTROESOPHAGEAL REFLUX DISEASE WITHOUT ESOPHAGITIS: ICD-10-CM

## 2023-05-17 RX ORDER — PANTOPRAZOLE SODIUM 40 MG/1
40 TABLET, DELAYED RELEASE ORAL DAILY
Qty: 30 TABLET | Refills: 0 | Status: SHIPPED | OUTPATIENT
Start: 2023-05-17

## 2023-06-06 ENCOUNTER — TELEPHONE (OUTPATIENT)
Dept: PULMONOLOGY | Facility: MEDICAL CENTER | Age: 58
End: 2023-06-06

## 2023-06-06 NOTE — TELEPHONE ENCOUNTER
LM for patient to call office back to schedule 1yr f/u with Dr Chet Christopher, schedule next available  Appointment reminder mailed

## 2023-06-23 DIAGNOSIS — Z83.1 FAMILY HISTORY OF HEPATITIS C: ICD-10-CM

## 2023-06-23 DIAGNOSIS — Z11.59 NEED FOR HEPATITIS C SCREENING TEST: Primary | ICD-10-CM

## 2023-06-26 ENCOUNTER — APPOINTMENT (OUTPATIENT)
Dept: LAB | Facility: HOSPITAL | Age: 58
End: 2023-06-26
Attending: FAMILY MEDICINE
Payer: COMMERCIAL

## 2023-06-26 DIAGNOSIS — Z11.59 NEED FOR HEPATITIS C SCREENING TEST: ICD-10-CM

## 2023-06-26 DIAGNOSIS — Z83.1 FAMILY HISTORY OF HEPATITIS C: ICD-10-CM

## 2023-06-26 PROCEDURE — 36415 COLL VENOUS BLD VENIPUNCTURE: CPT

## 2023-06-26 PROCEDURE — 86803 HEPATITIS C AB TEST: CPT

## 2023-06-26 PROCEDURE — 87522 HEPATITIS C REVRS TRNSCRPJ: CPT

## 2023-06-27 LAB — HCV AB SER QL: NORMAL

## 2023-06-29 LAB
HCV RNA SERPL NAA+PROBE-ACNC: NORMAL IU/ML
TEST INFORMATION: NORMAL

## 2023-06-30 DIAGNOSIS — K21.9 GASTROESOPHAGEAL REFLUX DISEASE WITHOUT ESOPHAGITIS: ICD-10-CM

## 2023-06-30 RX ORDER — PANTOPRAZOLE SODIUM 40 MG/1
TABLET, DELAYED RELEASE ORAL
Qty: 30 TABLET | Refills: 0 | Status: SHIPPED | OUTPATIENT
Start: 2023-06-30

## 2023-07-15 ENCOUNTER — OFFICE VISIT (OUTPATIENT)
Dept: URGENT CARE | Facility: CLINIC | Age: 58
End: 2023-07-15
Payer: COMMERCIAL

## 2023-07-15 VITALS
WEIGHT: 180 LBS | OXYGEN SATURATION: 98 % | BODY MASS INDEX: 31.89 KG/M2 | HEART RATE: 81 BPM | RESPIRATION RATE: 14 BRPM | TEMPERATURE: 98.7 F

## 2023-07-15 DIAGNOSIS — J02.0 STREP THROAT: Primary | ICD-10-CM

## 2023-07-15 LAB — S PYO AG THROAT QL: POSITIVE

## 2023-07-15 PROCEDURE — 87880 STREP A ASSAY W/OPTIC: CPT | Performed by: PHYSICIAN ASSISTANT

## 2023-07-15 PROCEDURE — 99214 OFFICE O/P EST MOD 30 MIN: CPT | Performed by: PHYSICIAN ASSISTANT

## 2023-07-15 RX ORDER — AZITHROMYCIN 250 MG/1
TABLET, FILM COATED ORAL
Qty: 6 TABLET | Refills: 0 | Status: SHIPPED | OUTPATIENT
Start: 2023-07-15 | End: 2023-07-19

## 2023-07-15 NOTE — PROGRESS NOTES
[facility]        NAME: Shawn Honeycutt is a 60 y.o. female  : 1965    MRN: [id number]  DATE: July 15, 2023  TIME: 3:41 PM    Assessment and Plan   Strep throat [J02.0]  1. Strep throat  POCT rapid strepA    azithromycin (ZITHROMAX) 250 mg tablet        Positive strep POCT. Will send antibiotics. Discussed importance of completing entire course of antibiotics. May use otc medications such as motrin and/or tylenol for pain, swelling, and fevers. Tea with or without honey is also very soothing for a sore throat. Considered contagious until having been on antibiotics for 24 hours. Discussed with pt that if at any point they develop difficulty handling secretions, fevers not responsive to medication, or worsening pain that they should go directly to ED. Discussed strict return to care precautions as well as red flag symptoms which should prompt immediate ED referral. Pt verbalized understanding and is in agreement with plan. Please follow up with your primary care provider within the next week. Please remember that your visit today was with an urgent care provider and should not replace follow up with your primary care provider for chronic medical issues or annual physicals. Patient Instructions       Follow up with PCP in 3-5 days. Proceed to  ER if symptoms worsen. Chief Complaint     Chief Complaint   Patient presents with   • Sore Throat     Pt presents with sore throat started two days ago         History of Present Illness       Patient is a 50-year-old female with past medical history of emphysema presenting with sore throat x2 days. Has been taking sudafed with some relief. Positive exposure to strep throat in the household. Endorses slight cough and congestion. Denies fever, GI symptoms, difficulty swallowing or handling secretions. Review of Systems   Review of Systems   Constitutional: Negative for chills, diaphoresis, fatigue and fever.    HENT: Positive for congestion and sore throat. Negative for ear pain, postnasal drip, rhinorrhea, sinus pain, sneezing and trouble swallowing. Eyes: Negative for pain and redness. Respiratory: Positive for cough. Negative for chest tightness, shortness of breath and wheezing. Cardiovascular: Negative for chest pain and leg swelling. Gastrointestinal: Negative for diarrhea, nausea and vomiting. Musculoskeletal: Negative for myalgias. Neurological: Negative for dizziness, weakness and headaches.          Current Medications       Current Outpatient Medications:   •  albuterol (ProAir HFA) 90 mcg/act inhaler, Inhale 2 puffs every 4 (four) hours as needed for wheezing, Disp: 8.5 g, Rfl: 7  •  azithromycin (ZITHROMAX) 250 mg tablet, Take 2 tablets today then 1 tablet daily x 4 days, Disp: 6 tablet, Rfl: 0  •  Budeson-Glycopyrrol-Formoterol (Breztri Aerosphere) 160-9-4.8 MCG/ACT AERO, Inhale 2 puffs 2 (two) times a day Rinse mouth after use., Disp: 10.7 g, Rfl: 11  •  fexofenadine (ALLEGRA) 180 MG tablet, Take 180 mg by mouth daily, Disp: , Rfl:   •  nicotine (NICOTROL) 10 MG inhaler, Use 1 cartridge per hour as needed do not exceed 10 per day, Disp: 168 each, Rfl: 3  •  pantoprazole (PROTONIX) 40 mg tablet, TAKE ONE TABLET BY MOUTH EVERYDAY, Disp: 30 tablet, Rfl: 0  •  pseudoephedrine (SUDAFED) 30 mg tablet, Take 30 mg by mouth every 4 (four) hours as needed for congestion, Disp: , Rfl:     Current Allergies     Allergies as of 07/15/2023 - Reviewed 07/15/2023   Allergen Reaction Noted   • Diphenhydramine Hives 04/18/2005   • Penicillins Hives 04/18/2005            The following portions of the patient's history were reviewed and updated as appropriate: allergies, current medications, past family history, past medical history, past social history, past surgical history and problem list.     Past Medical History:   Diagnosis Date   • Allergic rhinitis     Resolved 2/23/2017    • BRCA1 negative    • Chronic sinusitis     Resolved 1/4/2017 • COVID-19 01/10/2022   • Emphysema lung (HCC)    • Empyema (HCC)    • GERD (gastroesophageal reflux disease)    • Mitral valve prolapse    • Palpitations     Last assessed 9/13/2006    • Skin disorder     Resolved 1/4/2017    • Skin lesions     Resolved 1/4/2017        Past Surgical History:   Procedure Laterality Date   • BREAST BIOPSY Bilateral     beningn   • ENDOMETRIAL ABLATION     • LIPOMA RESECTION      arm   • TUBAL LIGATION         Family History   Problem Relation Age of Onset   • Mitral valve prolapse Father    • Heart disease Father         open heart surgery   • Stroke Father    • Breast cancer Sister 43   • Uterine cancer Paternal Grandmother    • Colon cancer Paternal Grandfather          Medications have been verified. Objective   Pulse 81   Temp 98.7 °F (37.1 °C)   Resp 14   Wt 81.6 kg (180 lb)   SpO2 98%   BMI 31.89 kg/m²        Physical Exam     Physical Exam  Vitals and nursing note reviewed. Constitutional:       General: She is not in acute distress. Appearance: Normal appearance. She is not toxic-appearing. HENT:      Head: Normocephalic and atraumatic. Right Ear: Tympanic membrane, ear canal and external ear normal.      Left Ear: Tympanic membrane, ear canal and external ear normal.      Nose: No congestion. Mouth/Throat:      Mouth: Mucous membranes are moist.      Pharynx: Uvula midline. Pharyngeal swelling and posterior oropharyngeal erythema present. No oropharyngeal exudate or uvula swelling. Tonsils: No tonsillar exudate. 2+ on the right. 2+ on the left. Eyes:      Conjunctiva/sclera: Conjunctivae normal.      Pupils: Pupils are equal, round, and reactive to light. Cardiovascular:      Rate and Rhythm: Normal rate and regular rhythm. Heart sounds: Normal heart sounds. Pulmonary:      Effort: Pulmonary effort is normal. No respiratory distress. Breath sounds: Normal breath sounds. No wheezing, rhonchi or rales.    Musculoskeletal: Cervical back: Normal range of motion and neck supple. Skin:     General: Skin is warm and dry. Capillary Refill: Capillary refill takes less than 2 seconds. Neurological:      Mental Status: She is alert and oriented to person, place, and time.    Psychiatric:         Behavior: Behavior normal.

## 2023-08-11 DIAGNOSIS — K21.9 GASTROESOPHAGEAL REFLUX DISEASE WITHOUT ESOPHAGITIS: ICD-10-CM

## 2023-08-11 RX ORDER — PANTOPRAZOLE SODIUM 40 MG/1
40 TABLET, DELAYED RELEASE ORAL DAILY
Qty: 30 TABLET | Refills: 0 | Status: SHIPPED | OUTPATIENT
Start: 2023-08-11

## 2023-09-29 ENCOUNTER — OFFICE VISIT (OUTPATIENT)
Dept: PULMONOLOGY | Facility: MEDICAL CENTER | Age: 58
End: 2023-09-29
Payer: COMMERCIAL

## 2023-09-29 DIAGNOSIS — F17.210 CIGARETTE NICOTINE DEPENDENCE WITHOUT COMPLICATION: ICD-10-CM

## 2023-09-29 DIAGNOSIS — R05.1 ACUTE COUGH: ICD-10-CM

## 2023-09-29 DIAGNOSIS — J01.00 ACUTE NON-RECURRENT MAXILLARY SINUSITIS: ICD-10-CM

## 2023-09-29 DIAGNOSIS — J43.2 CENTRILOBULAR EMPHYSEMA (HCC): Primary | ICD-10-CM

## 2023-09-29 DIAGNOSIS — J30.2 SEASONAL ALLERGIC RHINITIS, UNSPECIFIED TRIGGER: ICD-10-CM

## 2023-09-29 PROCEDURE — 94010 BREATHING CAPACITY TEST: CPT | Performed by: INTERNAL MEDICINE

## 2023-09-29 PROCEDURE — 99214 OFFICE O/P EST MOD 30 MIN: CPT | Performed by: INTERNAL MEDICINE

## 2023-09-29 RX ORDER — BUDESONIDE, GLYCOPYRROLATE, AND FORMOTEROL FUMARATE 160; 9; 4.8 UG/1; UG/1; UG/1
2 AEROSOL, METERED RESPIRATORY (INHALATION) 2 TIMES DAILY
Qty: 32.1 G | Refills: 4 | Status: SHIPPED | OUTPATIENT
Start: 2023-09-29

## 2023-09-29 RX ORDER — ALBUTEROL SULFATE 90 UG/1
2 AEROSOL, METERED RESPIRATORY (INHALATION) EVERY 6 HOURS PRN
Qty: 54 G | Refills: 3 | Status: SHIPPED | OUTPATIENT
Start: 2023-09-29

## 2023-09-29 RX ORDER — AZITHROMYCIN 250 MG/1
TABLET, FILM COATED ORAL
Qty: 6 TABLET | Refills: 0 | Status: SHIPPED | OUTPATIENT
Start: 2023-09-29 | End: 2023-10-03

## 2023-09-29 RX ORDER — MOMETASONE FUROATE 50 UG/1
2 SPRAY, METERED NASAL DAILY PRN
Qty: 51 G | Refills: 3 | Status: SHIPPED | OUTPATIENT
Start: 2023-09-29

## 2023-09-29 RX ORDER — HYDROCODONE POLISTIREX AND CHLORPHENIRAMINE POLISTIREX 10; 8 MG/5ML; MG/5ML
5 SUSPENSION, EXTENDED RELEASE ORAL EVERY 12 HOURS PRN
Qty: 115 ML | Refills: 0 | Status: SHIPPED | OUTPATIENT
Start: 2023-09-29

## 2023-09-29 RX ORDER — TRIAMCINOLONE ACETONIDE 55 UG/1
2 SPRAY, METERED NASAL DAILY
COMMUNITY

## 2023-09-29 NOTE — PATIENT INSTRUCTIONS
CT Lung Cancer screening. Back phone number: [phone]. Can try Breztri once a day in the morning. Use a spacer. Take 1 teaspoon of Tussinex once a day at bedtime as needed for cough and congestion.

## 2023-09-29 NOTE — PROGRESS NOTES
Assessment/Plan      Problem List Items Addressed This Visit        Respiratory    Centrilobular emphysema (HCC) - Primary     Mild COPD. Doing well with Breztri. I did give her a spacer to use with this and she will stay on 2 puffs twice a day. She is doing well until she could even consider taking 2 puffs just once a day         Relevant Medications    Triamcinolone Acetonide (Nasacort Allergy 24HR) 55 MCG/ACT nasal spray    mometasone (NASONEX) 50 mcg/act nasal spray    Budeson-Glycopyrrol-Formoterol (Breztri Aerosphere) 160-9-4.8 MCG/ACT AERO    Hydrocod Darian-Chlorphe Darian ER (TUSSIONEX) 10-8 mg/5 mL ER suspension    albuterol (Ventolin HFA) 90 mcg/act inhaler    Other Relevant Orders    POCT spirometry (Completed)    Acute non-recurrent maxillary sinusitis     She does have mild sinus infection. Has tenderness over maxillary sinuses. I did prescribe a 5-day Z-Rom. Relevant Medications    azithromycin (ZITHROMAX) 250 mg tablet    Seasonal allergic rhinitis     He does use Allegra 180 mg daily. Did prescribe Nasonex nasal spray she can use 2 sprays at nighttime for her nasal congestion. Relevant Medications    mometasone (NASONEX) 50 mcg/act nasal spray       Other    Cigarette nicotine dependence without complication     She is down to smoking 1/2 pack cigarettes per day. I did encourage her to quit smoking. She does have Nicotrol lozenges which she states she is going to use. She was not interested in Chantix. I did order a lung cancer screening CT scan of chest which she was agreeable to have done. Relevant Orders    CT lung screening program    Acute cough     He does have nasal congestion postnasal drainage. Has cough at nighttime. Despite related to postnasal drainage. I did give her Tussionex she can use 5 at nighttime as needed to help with the cough at night.          Relevant Medications    Hydrocod Darian-Chlorphe Darian ER (TUSSIONEX) 10-8 mg/5 mL ER suspension Cc:  Some cough and nasal congestion    HPI     Patient presents for follow up. She continues to smoke a half pack per day. Patient has tried Nicotrol inhaler without relief of cravings. She has not tried Chantix. Nicotine patches worked well for her. Patient notes that stress and panic attacks have prevented her from smoking cessation. Patient has  Nicotine lozenges and wants to try them. She does have history of smoking 1 pack of cigarettes per day for several years. She started smoking at age 12years old. She has decreased her smoking to 1/2 pack of cigarettes per day. He did have a complete PFT done at another institution showed some mild airflow obstruction with mild decrease in diffusion capacity measurement. Patient is using Breztri 2 puffs twice a day. She endorses hoarseness at the end of the day. Patient has used Stiolto in the past. Patient uses her albuterol inhaler as needed, at least once per week. Has occasional shortness of breath. Patient has increased allergies and associated headaches/congestion/post nasal drip. She uses Sudafed, Allergra, and Nasacort.      Past Medical History:   Diagnosis Date   • Allergic rhinitis     Resolved 2/23/2017    • BRCA1 negative    • Chronic sinusitis     Resolved 1/4/2017    • COVID-19 01/10/2022   • Emphysema lung ([address])    • Empyema (HCC)    • GERD (gastroesophageal reflux disease)    • Mitral valve prolapse    • Palpitations     Last assessed 9/13/2006    • Skin disorder     Resolved 1/4/2017    • Skin lesions     Resolved 1/4/2017        Past Surgical History:   Procedure Laterality Date   • BREAST BIOPSY Bilateral     beningn   • ENDOMETRIAL ABLATION     • LIPOMA RESECTION      arm   • TUBAL LIGATION           Current Outpatient Medications:   •  albuterol (ProAir HFA) 90 mcg/act inhaler, Inhale 2 puffs every 4 (four) hours as needed for wheezing, Disp: 8.5 g, Rfl: 7  •  albuterol (Ventolin HFA) 90 mcg/act inhaler, Inhale 2 puffs every 6 (six) hours as needed for wheezing, Disp: 54 g, Rfl: 3  •  azithromycin (ZITHROMAX) 250 mg tablet, Take 2 tablets today then 1 tablet daily x 4 days, Disp: 6 tablet, Rfl: 0  •  Budeson-Glycopyrrol-Formoterol (Breztri Aerosphere) 160-9-4.8 MCG/ACT AERO, Inhale 2 puffs 2 (two) times a day Rinse mouth after use., Disp: 32.1 g, Rfl: 4  •  fexofenadine (ALLEGRA) 180 MG tablet, Take 180 mg by mouth daily, Disp: , Rfl:   •  Hydrocod Darian-Chlorphe Darian ER (TUSSIONEX) 10-8 mg/5 mL ER suspension, Take 5 mL by mouth every 12 (twelve) hours as needed for cough Max Daily Amount: 10 mL, Disp: 115 mL, Rfl: 0  •  mometasone (NASONEX) 50 mcg/act nasal spray, 2 sprays into each nostril daily as needed (nasal congestion), Disp: 51 g, Rfl: 3  •  nicotine (NICOTROL) 10 MG inhaler, Use 1 cartridge per hour as needed do not exceed 10 per day, Disp: 168 each, Rfl: 3  •  pantoprazole (PROTONIX) 40 mg tablet, Take 1 tablet (40 mg total) by mouth daily, Disp: 90 tablet, Rfl: 1  •  pseudoephedrine (SUDAFED) 30 mg tablet, Take 30 mg by mouth every 4 (four) hours as needed for congestion, Disp: , Rfl:   •  Triamcinolone Acetonide (Nasacort Allergy 24HR) 55 MCG/ACT nasal spray, 2 sprays by Each Nare route daily, Disp: , Rfl:     Allergies   Allergen Reactions   • Diphenhydramine Hives     Reaction Date: 2005;    • Penicillins Hives     Reaction Date: 2005;         Social History     Tobacco Use   • Smoking status: Some Days     Packs/day: 0.50     Years: 45.00     Total pack years: 22.50     Types: Cigarettes     Start date: 11     Last attempt to quit: 2021     Years since quittin.8     Passive exposure: Never   • Smokeless tobacco: Never   • Tobacco comments:     Current smoker - As per [facility]   Substance Use Topics   • Alcohol use: Not Currently         Family History   Problem Relation Age of Onset   • Mitral valve prolapse Father    • Heart disease Father         open heart surgery   • Stroke Father    • Breast cancer Sister 43   • Uterine cancer Paternal Grandmother    • Colon cancer Paternal Grandfather        Review of Systems   Constitutional: Negative for activity change, appetite change, chills, fever and unexpected weight change. HENT: Positive for congestion, postnasal drip and sneezing. Negative for dental problem, ear pain, rhinorrhea, sinus pressure, sinus pain, sore throat, trouble swallowing and voice change. Eyes: Negative for visual disturbance. Respiratory: Negative for cough, shortness of breath and wheezing. See HPI   Cardiovascular: Negative for chest pain, palpitations and leg swelling. Gastrointestinal: Negative for abdominal pain, diarrhea, nausea and vomiting. Genitourinary: Negative for difficulty urinating. Musculoskeletal: Negative for arthralgias and myalgias. Skin: Negative for rash and wound. Allergic/Immunologic: Negative for environmental allergies and food allergies. Neurological: Positive for headaches. Negative for dizziness and light-headedness. Hematological: Negative for adenopathy. Psychiatric/Behavioral: Negative for agitation, behavioral problems and confusion. There were no vitals filed for this visit. There is no height or weight on file to calculate BMI. Weight (last 2 days)     None          Physical Exam  Constitutional:       General: She is not in acute distress. Appearance: She is well-developed. HENT:      Head: Normocephalic. Comments: Mild tenderness over maxillary sinus. Nose: Congestion present. Mouth/Throat:      Mouth: Mucous membranes are moist.      Pharynx: Oropharynx is clear. No oropharyngeal exudate. Comments: Mild cobblestoning over uvular and anterior pharynx. Eyes:      Conjunctiva/sclera: Conjunctivae normal.      Pupils: Pupils are equal, round, and reactive to light. Cardiovascular:      Rate and Rhythm: Normal rate and regular rhythm. Heart sounds: Normal heart sounds.    Pulmonary: Effort: Pulmonary effort is normal.      Comments: Clear lung sounds  Abdominal:      General: There is no distension. Palpations: Abdomen is soft. Tenderness: There is no abdominal tenderness. Musculoskeletal:      Right lower leg: No edema. Left lower leg: No edema. Skin:     General: Skin is warm and dry. Neurological:      Mental Status: She is alert and oriented to person, place, and time.        Office Spirometry Results: done today    FVC -   2.76 L   86%  FEV1 - 2.00 L   80%  FEV1/FVC% - 72%    Normal lung volumes

## 2023-10-01 PROBLEM — J30.2 SEASONAL ALLERGIC RHINITIS: Status: ACTIVE | Noted: 2023-10-01

## 2023-10-01 PROBLEM — R05.1 ACUTE COUGH: Status: ACTIVE | Noted: 2023-10-01

## 2023-10-01 NOTE — ASSESSMENT & PLAN NOTE
Mild COPD. Doing well with Breztri. I did give her a spacer to use with this and she will stay on 2 puffs twice a day.   She is doing well until she could even consider taking 2 puffs just once a day

## 2023-10-01 NOTE — ASSESSMENT & PLAN NOTE
He does have nasal congestion postnasal drainage. Has cough at nighttime. Despite related to postnasal drainage. I did give her Tussionex she can use 5 at nighttime as needed to help with the cough at night.

## 2023-10-01 NOTE — ASSESSMENT & PLAN NOTE
He does use Allegra 180 mg daily. Did prescribe Nasonex nasal spray she can use 2 sprays at nighttime for her nasal congestion.

## 2023-10-01 NOTE — ASSESSMENT & PLAN NOTE
She does have mild sinus infection. Has tenderness over maxillary sinuses. I did prescribe a 5-day Z-Rom.

## 2023-10-01 NOTE — ASSESSMENT & PLAN NOTE
She is down to smoking 1/2 pack cigarettes per day. I did encourage her to quit smoking. She does have Nicotrol lozenges which she states she is going to use. She was not interested in Chantix. I did order a lung cancer screening CT scan of chest which she was agreeable to have done.

## 2023-10-03 ENCOUNTER — TELEPHONE (OUTPATIENT)
Age: 58
End: 2023-10-03

## 2023-11-30 PROBLEM — R05.1 ACUTE COUGH: Status: RESOLVED | Noted: 2023-10-01 | Resolved: 2023-11-30

## 2023-11-30 PROBLEM — J01.00 ACUTE NON-RECURRENT MAXILLARY SINUSITIS: Status: RESOLVED | Noted: 2023-02-07 | Resolved: 2023-11-30

## 2024-01-21 ENCOUNTER — OFFICE VISIT (OUTPATIENT)
Dept: URGENT CARE | Facility: CLINIC | Age: 59
End: 2024-01-21
Payer: COMMERCIAL

## 2024-01-21 VITALS
RESPIRATION RATE: 14 BRPM | WEIGHT: 172 LBS | OXYGEN SATURATION: 97 % | HEART RATE: 96 BPM | BODY MASS INDEX: 30.47 KG/M2 | TEMPERATURE: 98.2 F

## 2024-01-21 DIAGNOSIS — J01.90 ACUTE SINUSITIS, RECURRENCE NOT SPECIFIED, UNSPECIFIED LOCATION: Primary | ICD-10-CM

## 2024-01-21 PROCEDURE — 99213 OFFICE O/P EST LOW 20 MIN: CPT

## 2024-01-21 RX ORDER — DOXYCYCLINE 100 MG/1
100 TABLET ORAL 2 TIMES DAILY
Qty: 14 TABLET | Refills: 0 | Status: SHIPPED | OUTPATIENT
Start: 2024-01-21 | End: 2024-01-28

## 2024-01-21 NOTE — PROGRESS NOTES
[facility]        NAME: Mirta Black is a 58 y.o. female  : 1965    MRN: [id number]  DATE: 2024  TIME: 2:53 PM    Assessment and Plan   Acute sinusitis, recurrence not specified, unspecified location [J01.90]  1. Acute sinusitis, recurrence not specified, unspecified location  doxycycline (ADOXA) 100 MG tablet      Please begin antibiotics as directed.   Please continue nasal spray as directed.   Stay well hydrated.   Follow up with PCP if no relief within one week.       Patient Instructions   Sinusitis   WHAT YOU NEED TO KNOW:   Sinusitis is inflammation or infection of your sinuses. Sinusitis is most often caused by a virus. Acute sinusitis may last up to 12 weeks. Chronic sinusitis lasts longer than 12 weeks. Recurrent sinusitis means you have 4 or more infections in 1 year.         DISCHARGE INSTRUCTIONS:   Return to the emergency department if:   You have trouble breathing or wheezing that is getting worse.     You have a stiff neck, a fever, or a bad headache.      You cannot open your eye.      Your eyeball bulges out or you cannot move your eye.      You are more sleepy than normal, or you notice changes in your ability to think, move, or talk.     You have swelling of your forehead or scalp.     Call your doctor if:   You have vision changes, such as double vision.     Your eye and eyelid are red, swollen, and painful.      Your symptoms do not improve or go away after 10 days.     You have nausea and are vomiting.     Your nose is bleeding.     You have questions or concerns about your condition or care.     Medicines:  Your symptoms may go away on their own. Your healthcare provider may recommend watchful waiting for up to 10 days before starting antibiotics. You may need any of the following:  Acetaminophen  decreases pain and fever. It is available without a doctor's order. Ask how much to take and how often to take it. Follow directions. Read the labels of all other  medicines you are using to see if they also contain acetaminophen, or ask your doctor or pharmacist. Acetaminophen can cause liver damage if not taken correctly.     NSAIDs , such as ibuprofen, help decrease swelling, pain, and fever. This medicine is available with or without a doctor's order. NSAIDs can cause stomach bleeding or kidney problems in certain people. If you take blood thinner medicine, always ask your healthcare provider if NSAIDs are safe for you. Always read the medicine label and follow directions.     Nasal steroid sprays  may help decrease inflammation in your nose and sinuses.     Decongestants  help reduce swelling and drain mucus in the nose and sinuses. They may help you breathe easier.      Antihistamines  help dry mucus in the nose and relieve sneezing.      Antibiotics  help treat or prevent a bacterial infection.     Take your medicine as directed.  Contact your healthcare provider if you think your medicine is not helping or if you have side effects. Tell your provider if you are allergic to any medicine. Keep a list of the medicines, vitamins, and herbs you take. Include the amounts, and when and why you take them. Bring the list or the pill bottles to follow-up visits. Carry your medicine list with you in case of an emergency.     Self-care:   Rinse your sinuses as directed.  Use a sinus rinse device to rinse your nasal passages with a saline (salt water) solution or distilled water. Do not use tap water. This will help thin the mucus in your nose and rinse away pollen and dirt. It will also help reduce swelling so you can breathe normally.     Use a humidifier  to increase air moisture in your home. This may make it easier for you to breathe and help decrease your cough.      Sleep with your head elevated.  Place an extra pillow under your head before you go to sleep to help your sinuses drain.      Drink liquids as directed.  Ask your healthcare provider how much liquid to drink each  day and which liquids are best for you. Liquids will thin the mucus in your nose and help it drain. Avoid drinks that contain alcohol or caffeine.      Do not smoke, and avoid secondhand smoke.  Nicotine and other chemicals in cigarettes and cigars can make your symptoms worse. Ask your healthcare provider for information if you currently smoke and need help to quit. E-cigarettes or smokeless tobacco still contain nicotine. Talk to your healthcare provider before you use these products.     Prevent the spread of germs:   Wash your hands often with soap and water.  Wash your hands after you use the bathroom, change a child's diaper, or sneeze. Wash your hands before you prepare or eat food.          Stay away from people who are sick.  Some germs spread easily and quickly through contact.     Follow up with your doctor as directed:  You may be referred to an ear, nose, and throat specialist. Write down your questions so you remember to ask them during your visits.   © Copyright [employer] 2023 Information is for End User's use only and may not be sold, redistributed or otherwise used for commercial purposes.  The above information is an  only. It is not intended as medical advice for individual conditions or treatments. Talk to your doctor, nurse or pharmacist before following any medical regimen to see if it is safe and effective for you.       Follow up with PCP in 3-5 days.  Proceed to  ER if symptoms worsen.    Chief Complaint     Chief Complaint   Patient presents with    Cold Like Symptoms     Pt presents with pain behind eyes started 3 weeks ago, neck pain, headache, PND,          History of Present Illness       Sinusitis  This is a recurrent problem. The current episode started 1 to 4 weeks ago (began with URI x 3 weeks ago, improved x 1 week then symptoms returned x 2 weeks ago.). The problem has been gradually worsening since onset. There has been no fever. Associated symptoms include  congestion, headaches and sinus pressure. Pertinent negatives include no chills, coughing, diaphoresis, ear pain, hoarse voice, neck pain, shortness of breath, sneezing, sore throat or swollen glands. Past treatments include nasal decongestants and oral decongestants. The treatment provided no relief.       Review of Systems   Review of Systems   Constitutional:  Negative for chills, diaphoresis, fatigue and fever.   HENT:  Positive for congestion and sinus pressure. Negative for ear discharge, ear pain, hoarse voice, postnasal drip, rhinorrhea, sinus pain, sneezing and sore throat.    Eyes: Negative.  Negative for pain, discharge, redness and itching.   Respiratory: Negative.  Negative for apnea, cough, choking, chest tightness, shortness of breath, wheezing and stridor.    Cardiovascular: Negative.  Negative for chest pain and palpitations.   Gastrointestinal: Negative.  Negative for diarrhea, nausea and vomiting.   Endocrine: Negative.  Negative for polydipsia, polyphagia and polyuria.   Genitourinary: Negative.  Negative for decreased urine volume and flank pain.   Musculoskeletal: Negative.  Negative for arthralgias, back pain, myalgias, neck pain and neck stiffness.   Skin: Negative.  Negative for color change and rash.   Allergic/Immunologic: Negative.  Negative for environmental allergies.   Neurological:  Positive for headaches. Negative for dizziness, facial asymmetry, light-headedness and numbness.   Hematological: Negative.  Negative for adenopathy.   Psychiatric/Behavioral: Negative.           Current Medications       Current Outpatient Medications:     albuterol (ProAir HFA) 90 mcg/act inhaler, Inhale 2 puffs every 4 (four) hours as needed for wheezing, Disp: 8.5 g, Rfl: 7    Budeson-Glycopyrrol-Formoterol (Breztri Aerosphere) 160-9-4.8 MCG/ACT AERO, Inhale 2 puffs 2 (two) times a day Rinse mouth after use., Disp: 32.1 g, Rfl: 4    doxycycline (ADOXA) 100 MG tablet, Take 1 tablet (100 mg total) by  mouth 2 (two) times a day for 7 days, Disp: 14 tablet, Rfl: 0    fexofenadine (ALLEGRA) 180 MG tablet, Take 180 mg by mouth daily, Disp: , Rfl:     mometasone (NASONEX) 50 mcg/act nasal spray, 2 sprays into each nostril daily as needed (nasal congestion), Disp: 51 g, Rfl: 3    pantoprazole (PROTONIX) 40 mg tablet, Take 1 tablet (40 mg total) by mouth daily, Disp: 90 tablet, Rfl: 1    pseudoephedrine (SUDAFED) 30 mg tablet, Take 30 mg by mouth every 4 (four) hours as needed for congestion, Disp: , Rfl:     Triamcinolone Acetonide (Nasacort Allergy 24HR) 55 MCG/ACT nasal spray, 2 sprays by Each Nare route daily, Disp: , Rfl:     albuterol (Ventolin HFA) 90 mcg/act inhaler, Inhale 2 puffs every 6 (six) hours as needed for wheezing (Patient not taking: Reported on 1/21/2024), Disp: 54 g, Rfl: 3    Hydrocod Darian-Chlorphe Darian ER (TUSSIONEX) 10-8 mg/5 mL ER suspension, Take 5 mL by mouth every 12 (twelve) hours as needed for cough Max Daily Amount: 10 mL (Patient not taking: Reported on 1/21/2024), Disp: 115 mL, Rfl: 0    nicotine (NICOTROL) 10 MG inhaler, Use 1 cartridge per hour as needed do not exceed 10 per day (Patient not taking: Reported on 1/21/2024), Disp: 168 each, Rfl: 3    Current Allergies     Allergies as of 01/21/2024 - Reviewed 01/21/2024   Allergen Reaction Noted    Diphenhydramine Hives 04/18/2005    Penicillins Hives 04/18/2005            The following portions of the patient's history were reviewed and updated as appropriate: allergies, current medications, past family history, past medical history, past social history, past surgical history and problem list.     Past Medical History:   Diagnosis Date    Allergic rhinitis     Resolved 2/23/2017     BRCA1 negative     Chronic sinusitis     Resolved 1/4/2017     COVID-19 01/10/2022    Emphysema lung (HCC)     Empyema (HCC)     GERD (gastroesophageal reflux disease)     Mitral valve prolapse     Palpitations     Last assessed 9/13/2006     Skin disorder      Resolved 1/4/2017     Skin lesions     Resolved 1/4/2017        Past Surgical History:   Procedure Laterality Date    BREAST BIOPSY Bilateral     beningn    ENDOMETRIAL ABLATION      LIPOMA RESECTION      arm    TUBAL LIGATION         Family History   Problem Relation Age of Onset    Mitral valve prolapse Father     Heart disease Father         open heart surgery    Stroke Father     Breast cancer Sister 42    Uterine cancer Paternal Grandmother     Colon cancer Paternal Grandfather          Medications have been verified.        Objective   Pulse 96   Temp 98.2 °F (36.8 °C)   Resp 14   Wt 78 kg (172 lb)   SpO2 97%   BMI 30.47 kg/m²        Physical Exam     Physical Exam  Vitals and nursing note reviewed.   Constitutional:       General: She is not in acute distress.     Appearance: Normal appearance. She is not ill-appearing, toxic-appearing or diaphoretic.      Interventions: She is not intubated.  HENT:      Head: Normocephalic and atraumatic.      Right Ear: Tympanic membrane, ear canal and external ear normal. There is no impacted cerumen.      Left Ear: Tympanic membrane, ear canal and external ear normal. There is no impacted cerumen.      Nose: Nose normal. No congestion or rhinorrhea.      Mouth/Throat:      Mouth: Mucous membranes are dry.   Eyes:      Extraocular Movements: Extraocular movements intact.      Conjunctiva/sclera: Conjunctivae normal.      Pupils: Pupils are equal, round, and reactive to light.   Cardiovascular:      Rate and Rhythm: Normal rate and regular rhythm.      Pulses: Normal pulses.      Heart sounds: Normal heart sounds, S1 normal and S2 normal. Heart sounds not distant. No murmur heard.     No friction rub. No gallop.   Pulmonary:      Effort: Pulmonary effort is normal. No tachypnea, bradypnea, accessory muscle usage, prolonged expiration, respiratory distress or retractions. She is not intubated.      Breath sounds: Normal breath sounds. No stridor, decreased air  movement or transmitted upper airway sounds. No decreased breath sounds, wheezing, rhonchi or rales.   Abdominal:      General: Bowel sounds are normal.      Palpations: Abdomen is soft.      Tenderness: There is no abdominal tenderness. There is no guarding or rebound.   Musculoskeletal:         General: Normal range of motion.      Cervical back: Normal range of motion and neck supple. No rigidity or tenderness.   Lymphadenopathy:      Cervical: No cervical adenopathy.   Skin:     General: Skin is warm and dry.      Capillary Refill: Capillary refill takes less than 2 seconds.   Neurological:      General: No focal deficit present.      Mental Status: She is alert and oriented to person, place, and time.      Cranial Nerves: No cranial nerve deficit.   Psychiatric:         Mood and Affect: Mood normal.         Behavior: Behavior normal.

## 2024-01-21 NOTE — PATIENT INSTRUCTIONS
Please begin antibiotics as directed.   Please continue nasal spray as directed.   Stay well hydrated.   Follow up with PCP if no relief within one week.

## 2024-01-22 ENCOUNTER — TELEPHONE (OUTPATIENT)
Age: 59
End: 2024-01-22

## 2024-01-22 DIAGNOSIS — J01.90 ACUTE SINUSITIS, RECURRENCE NOT SPECIFIED, UNSPECIFIED LOCATION: Primary | ICD-10-CM

## 2024-01-22 RX ORDER — AZITHROMYCIN 250 MG/1
TABLET, FILM COATED ORAL DAILY
Qty: 6 TABLET | Refills: 0 | Status: SHIPPED | OUTPATIENT
Start: 2024-01-22 | End: 2024-01-27

## 2024-01-22 NOTE — TELEPHONE ENCOUNTER
Received call from patient reporting being seen at [facility] yesterday 1/21 and diagnosed with sinus infection. Prescribed doxycycline (ADOXA) 100 MG tablet  and patient reports having GI (gas, diarrhea) problems in the past with this antibiotic. Patient is requesting change of antibiotic to Azithromycin (Z-pack) and please send to [facility] on [facility]. Please follow up with patient.

## 2024-02-27 ENCOUNTER — OFFICE VISIT (OUTPATIENT)
Dept: FAMILY MEDICINE CLINIC | Facility: CLINIC | Age: 59
End: 2024-02-27
Payer: COMMERCIAL

## 2024-02-27 VITALS
HEIGHT: 63 IN | TEMPERATURE: 98.3 F | RESPIRATION RATE: 14 BRPM | WEIGHT: 168 LBS | HEART RATE: 107 BPM | SYSTOLIC BLOOD PRESSURE: 126 MMHG | BODY MASS INDEX: 29.77 KG/M2 | DIASTOLIC BLOOD PRESSURE: 62 MMHG

## 2024-02-27 DIAGNOSIS — Z13.29 SCREENING FOR THYROID DISORDER: ICD-10-CM

## 2024-02-27 DIAGNOSIS — F41.1 GENERALIZED ANXIETY DISORDER: ICD-10-CM

## 2024-02-27 DIAGNOSIS — Z12.31 ENCOUNTER FOR SCREENING MAMMOGRAM FOR BREAST CANCER: ICD-10-CM

## 2024-02-27 DIAGNOSIS — R10.9 ABDOMINAL PAIN, UNSPECIFIED ABDOMINAL LOCATION: Primary | ICD-10-CM

## 2024-02-27 DIAGNOSIS — L40.9 PSORIASIS: ICD-10-CM

## 2024-02-27 PROCEDURE — 99214 OFFICE O/P EST MOD 30 MIN: CPT | Performed by: FAMILY MEDICINE

## 2024-02-27 RX ORDER — ALPRAZOLAM 0.25 MG/1
0.25 TABLET ORAL
Qty: 20 TABLET | Refills: 0 | Status: SHIPPED | OUTPATIENT
Start: 2024-02-27

## 2024-02-27 RX ORDER — MOMETASONE FUROATE 1 MG/G
CREAM TOPICAL DAILY
Qty: 45 G | Refills: 0 | Status: SHIPPED | OUTPATIENT
Start: 2024-02-27

## 2024-02-27 NOTE — PROGRESS NOTES
Assessment/Plan:    1. Abdominal pain, unspecified abdominal location  -     Comprehensive metabolic panel; Future  -     CBC; Future  -     Amylase; Future  -     Lipase; Future    2. Encounter for screening mammogram for breast cancer  -     Mammo screening bilateral w 3d & cad; Future    3. Screening for thyroid disorder  -     TSH, 3rd generation; Future    4. Generalized anxiety disorder  -     ALPRAZolam (XANAX) 0.25 mg tablet; Take 1 tablet (0.25 mg total) by mouth daily at bedtime as needed for anxiety  -     TSH, 3rd generation; Future    5. Psoriasis  -     Ambulatory Referral to Dermatology; Future  -     mometasone (ELOCON) 0.1 % cream; Apply topically daily  -     TSH, 3rd generation; Future    Abd pain is all but gone.  No hernia palpated - I suspect she pulled a muscle.  I will get labs.  Pt requesting a TSH as well which I will order.  Pt wants to see derm - order in.  Steroids given        There are no Patient Instructions on file for this visit.    No follow-ups on file.    Subjective:      Patient ID: Mirta Black is a 58 y.o. female.    Chief Complaint   Patient presents with   • Mass     Lump on right side abdomin near belly button (movement bothers it sharp pain)  lw cma       Pt is scheduled for abdominal pain    Pt states 5 days ago she started with a pain near her belly button  Fwelt like a pulled muscle  Got worse  Felt a little lump. Started getting better.  Every once in a while she will feel pain  Pain is mostly with mvmt  No blood in stool and urine  Pt states the pain is all but gone    She is doing a lot miore lifting since her  had a stroke in the house.      Pt is asking to have her thyroid drawn    Pt state she has day where she feel overwhelmed with what is going on with her .  States this happens once a week  .  Would like to have xanax on board    Pt states she suffers from psoriasis and states she would like to see derm.  Pt would also like  steroids        Abdominal Pain  This is a new problem. The current episode started in the past 7 days. The onset quality is sudden. The problem occurs constantly. The problem has been gradually worsening. The pain is located in the periumbilical region and right flank. The pain is at a severity of 5/10. The quality of the pain is sharp. The abdominal pain radiates to the RLQ and suprapubic region. Associated symptoms include anorexia, belching, flatus, headaches and myalgias. Pertinent negatives include no arthralgias, constipation, diarrhea, dysuria, fever, frequency, hematochezia, hematuria, melena, nausea, vomiting or weight loss. The pain is aggravated by certain positions and movement. The pain is relieved by Being still and certain positions.       The following portions of the patient's history were reviewed and updated as appropriate: allergies, current medications, past family history, past medical history, past social history, past surgical history and problem list.    Review of Systems   Constitutional:  Negative for fever and weight loss.   Gastrointestinal:  Positive for abdominal pain, anorexia and flatus. Negative for constipation, diarrhea, hematochezia, melena, nausea and vomiting.   Genitourinary:  Negative for dysuria, frequency and hematuria.   Musculoskeletal:  Positive for myalgias. Negative for arthralgias.   Skin:  Positive for rash.   Neurological:  Positive for headaches.   Psychiatric/Behavioral:  The patient is nervous/anxious.          Current Outpatient Medications   Medication Sig Dispense Refill   • albuterol (Ventolin HFA) 90 mcg/act inhaler Inhale 2 puffs every 6 (six) hours as needed for wheezing 54 g 3   • ALPRAZolam (XANAX) 0.25 mg tablet Take 1 tablet (0.25 mg total) by mouth daily at bedtime as needed for anxiety 20 tablet 0   • Budeson-Glycopyrrol-Formoterol (Breztri Aerosphere) 160-9-4.8 MCG/ACT AERO Inhale 2 puffs 2 (two) times a day Rinse mouth after use. 32.1 g 4   •  "fexofenadine (ALLEGRA) 180 MG tablet Take 180 mg by mouth daily     • mometasone (ELOCON) 0.1 % cream Apply topically daily 45 g 0   • mometasone (NASONEX) 50 mcg/act nasal spray 2 sprays into each nostril daily as needed (nasal congestion) 51 g 3   • nicotine (NICOTROL) 10 MG inhaler Use 1 cartridge per hour as needed do not exceed 10 per day 168 each 3   • pantoprazole (PROTONIX) 40 mg tablet Take 1 tablet (40 mg total) by mouth daily 90 tablet 1   • pseudoephedrine (SUDAFED) 30 mg tablet Take 30 mg by mouth every 4 (four) hours as needed for congestion     • Triamcinolone Acetonide (Nasacort Allergy 24HR) 55 MCG/ACT nasal spray 2 sprays by Each Nare route daily       No current facility-administered medications for this visit.       Objective:    /62   Pulse (!) 107   Temp 98.3 °F (36.8 °C) (Tympanic)   Resp 14   Ht 5' 3\" (1.6 m)   Wt 76.2 kg (168 lb)   BMI 29.76 kg/m²        Physical Exam  Psychiatric:         Mood and Affect: Mood is anxious.                Frank Lombardi,   "

## 2024-04-22 DIAGNOSIS — J43.2 CENTRILOBULAR EMPHYSEMA (HCC): ICD-10-CM

## 2024-04-22 DIAGNOSIS — K21.9 GASTROESOPHAGEAL REFLUX DISEASE WITHOUT ESOPHAGITIS: ICD-10-CM

## 2024-04-22 RX ORDER — PANTOPRAZOLE SODIUM 40 MG/1
40 TABLET, DELAYED RELEASE ORAL DAILY
Qty: 90 TABLET | Refills: 0 | Status: SHIPPED | OUTPATIENT
Start: 2024-04-22

## 2024-04-23 RX ORDER — BUDESONIDE, GLYCOPYRROLATE, AND FORMOTEROL FUMARATE 160; 9; 4.8 UG/1; UG/1; UG/1
2 AEROSOL, METERED RESPIRATORY (INHALATION) 2 TIMES DAILY
Qty: 32.1 G | Refills: 0 | Status: SHIPPED | OUTPATIENT
Start: 2024-04-23

## 2024-04-29 ENCOUNTER — CONSULT (OUTPATIENT)
Age: 59
End: 2024-04-29
Payer: COMMERCIAL

## 2024-04-29 VITALS — HEIGHT: 63 IN | WEIGHT: 168 LBS | BODY MASS INDEX: 29.77 KG/M2 | TEMPERATURE: 97.5 F

## 2024-04-29 DIAGNOSIS — D22.9 MULTIPLE MELANOCYTIC NEVI: Primary | ICD-10-CM

## 2024-04-29 DIAGNOSIS — L81.4 LENTIGO: ICD-10-CM

## 2024-04-29 DIAGNOSIS — L85.3 XEROSIS OF SKIN: ICD-10-CM

## 2024-04-29 DIAGNOSIS — D18.01 CHERRY ANGIOMA: ICD-10-CM

## 2024-04-29 DIAGNOSIS — L40.9 PSORIASIS: ICD-10-CM

## 2024-04-29 PROCEDURE — 99244 OFF/OP CNSLTJ NEW/EST MOD 40: CPT | Performed by: DERMATOLOGY

## 2024-04-29 RX ORDER — CALCIPOTRIENE 50 UG/G
CREAM TOPICAL 2 TIMES DAILY
Qty: 60 G | Refills: 5 | Status: SHIPPED | OUTPATIENT
Start: 2024-04-29

## 2024-04-29 RX ORDER — CLOBETASOL PROPIONATE 0.5 MG/G
CREAM TOPICAL 2 TIMES DAILY
Qty: 60 G | Refills: 5 | Status: SHIPPED | OUTPATIENT
Start: 2024-04-29

## 2024-04-29 NOTE — PATIENT INSTRUCTIONS
"MELANOCYTIC NEVI (\"Moles\")    Physical Exam:  Anatomic Location Affected:   Mostly on sun-exposed areas of the trunk and extremities  Morphological Description:  Scattered, 1-4mm round to ovoid, symmetrical-appearing, even bordered, skin colored to dark brown macules/papules, mostly in sun-exposed areas  Pertinent Positives:  Pertinent Negatives:    Additional History of Present Condition:      Assessment and Plan:  Based on a thorough discussion of this condition and the management approach to it (including a comprehensive discussion of the known risks, side effects and potential benefits of treatment), the patient (family) agrees to implement the following specific plan:  When outside we recommend using a wide brim hat, sunglasses, long sleeve and pants, sunscreen with SPF 30+ with reapplication every 2 hours, or SPF specific clothing   Benign, reassured  Annual skin check     Melanocytic Nevi  Melanocytic nevi (\"moles\") are tan or brown, raised or flat areas of the skin which have an increased number of melanocytes. Melanocytes are the cells in our body which make pigment and account for skin color.    Some moles are present at birth (I.e., \"congenital nevi\"), while others come up later in life (i.e., \"acquired nevi\").  The sun can stimulate the body to make more moles.  Sunburns are not the only thing that triggers more moles.  Chronic sun exposure can do it too.     Clinically distinguishing a healthy mole from melanoma may be difficult, even for experienced dermatologists. The \"ABCDE's\" of moles have been suggested as a means of helping to alert a person to a suspicious mole and the possible increased risk of melanoma.  The suggestions for raising alert are as follows:    Asymmetry: Healthy moles tend to be symmetric, while melanomas are often asymmetric.  Asymmetry means if you draw a line through the mole, the two halves do not match in color, size, shape, or surface texture. Asymmetry can be a result of " "rapid enlargement of a mole, the development of a raised area on a previously flat lesion, scaling, ulceration, bleeding or scabbing within the mole.  Any mole that starts to demonstrate \"asymmetry\" should be examined promptly by a board certified dermatologist.     Border: Healthy moles tend to have discrete, even borders.  The border of a melanoma often blends into the normal skin and does not sharply delineate the mole from normal skin.  Any mole that starts to demonstrate \"uneven borders\" should be examined promptly by a board certified dermatologist.     Color: Healthy moles tend to be one color throughout.  Melanomas tend to be made up of different colors ranging from dark black, blue, white, or red.  Any mole that demonstrates a color change should be examined promptly by a board certified dermatologist.     Diameter: Healthy moles tend to be smaller than 0.6 cm in size; an exception are \"congenital nevi\" that can be larger.  Melanomas tend to grow and can often be greater than 0.6 cm (1/4 of an inch, or the size of a pencil eraser). This is only a guideline, and many normal moles may be larger than 0.6 cm without being unhealthy.  Any mole that starts to change in size (small to bigger or bigger to smaller) should be examined promptly by a board certified dermatologist.     Evolving: Healthy moles tend to \"stay the same.\"  Melanomas may often show signs of change or evolution such as a change in size, shape, color, or elevation.  Any mole that starts to itch, bleed, crust, burn, hurt, or ulcerate or demonstrate a change or evolution should be examined promptly by a board certified dermatologist.        LENTIGO    Physical Exam:  Anatomic Location Affected:  trunk, arms  Morphological Description:  Light brown macules  Pertinent Positives:  Pertinent Negatives:    Additional History of Present Condition:      Assessment and Plan:  Based on a thorough discussion of this condition and the management approach to " it (including a comprehensive discussion of the known risks, side effects and potential benefits of treatment), the patient (family) agrees to implement the following specific plan:  When outside we recommend using a wide brim hat, sunglasses, long sleeve and pants, sunscreen with SPF 30+ with reapplication every 2 hours, or SPF specific clothing       What is a lentigo?  A lentigo is a pigmented flat or slightly raised lesion with a clearly defined edge. Unlike an ephelis (freckle), it does not fade in the winter months. There are several kinds of lentigo.  The name lentigo originally referred to its appearance resembling a small lentil. The plural of lentigo is lentigines, although “lentigos” is also in common use.    Who gets lentigines?  Lentigines can affect males and females of all ages and races. Solar lentigines are especially prevalent in fair skinned adults. Lentigines associated with syndromes are present at birth or arise during childhood.    What causes lentigines?  Common forms of lentigo are due to exposure to ultraviolet radiation:  Sun damage including sunburn   Indoor tanning   Phototherapy, especially photochemotherapy (PUVA)    Ionizing radiation, eg radiation therapy, can also cause lentigines.  Several familial syndromes associated with widespread lentigines originate from mutations in Be-MAP kinase, mTOR signaling and PTEN pathways.    What is the treatment for lentigines?  Most lentigines are left alone. Attempts to lighten them may not be successful. The following approaches are used:  SPF 50+ broad-spectrum sunscreen   Hydroquinone bleaching cream   Alpha hydroxy acids   Vitamin C   Retinoids   Azelaic acid   Chemical peels  Individual lesions can be permanently removed using:  Cryotherapy   Intense pulsed light   Pigment lasers    How can lentigines be prevented?  Lentigines associated with exposure ultraviolet radiation can be prevented by very careful sun protection. Clothing is more  "successful at preventing new lentigines than are sunscreens.    What is the outlook for lentigines?  Lentigines usually persist. They may increase in number with age and sun exposure. Some in sun-protected sites may fade and disappear.    DWYER ANGIOMAS    Physical Exam:  Anatomic Location Affected:  trunk  Morphological Description:  Scattered cherry red, 1-4 mm papules.  Pertinent Positives:  Pertinent Negatives:    Additional History of Present Condition:      Assessment and Plan:  Based on a thorough discussion of this condition and the management approach to it (including a comprehensive discussion of the known risks, side effects and potential benefits of treatment), the patient (family) agrees to implement the following specific plan:  Monitor for changes  Benign, reassured      Assessment and Plan:    Cherry angioma, also known as Wright de Sean spots, are benign vascular skin lesions. A \"cherry angioma\" is a firm red, blue or purple papule, 0.1-1 cm in diameter. When thrombosed, they can appear black in colour until evaluated with a dermatoscope when the red or purple colour is more easily seen. Cherry angioma may develop on any part of the body but most often appear on the scalp, face, lips and trunk.  An angioma is due to proliferating endothelial cells; these are the cells that line the inside of a blood vessel.    Angiomas can arise in early life or later in life; the most common type of angioma is a cherry angioma.  Cherry angiomas are very common in males and females of any age or race. They are more noticeable in white skin than in skin of colour. They markedly increase in number from about the age of 40. There may be a family history of similar lesions. Eruptive cherry angiomas have been rarely reported to be associated with internal malignancy. The cause of angiomas is unknown. Genetic analysis of cherry angiomas has shown that they frequently carry specific somatic missense mutations in the " "GNAQ and GNA11 (Q209H) genes, which are involved in other vascular and melanocytic proliferations.      SEBORRHEIC KERATOSIS; NON-INFLAMED    Physical Exam:  Anatomic Location Affected:  trunk  Morphological Description:  Flat and raised, waxy, smooth to warty textured, yellow to brownish-grey to dark brown to blackish, discrete, \"stuck-on\" appearing papules.  Pertinent Positives:  Pertinent Negatives:    Additional History of Present Condition:      Assessment and Plan:  Based on a thorough discussion of this condition and the management approach to it (including a comprehensive discussion of the known risks, side effects and potential benefits of treatment), the patient (family) agrees to implement the following specific plan:  Monitor for changes  Benign, reassured      Seborrheic Keratosis  A seborrheic keratosis is a harmless warty spot that appears during adult life as a common sign of skin aging.  Seborrheic keratoses can arise on any area of skin, covered or uncovered, with the usual exception of the palms and soles. They do not arise from mucous membranes. Seborrheic keratoses can have highly variable appearance.      Seborrheic keratoses are extremely common. It has been estimated that over 90% of adults over the age of 60 years have one or more of them. They occur in males and females of all races, typically beginning to erupt in the 30s or 40s. They are uncommon under the age of 20 years.  The precise cause of seborrhoeic keratoses is not known.  Seborrhoeic keratoses are considered degenerative in nature. As time goes by, seborrheic keratoses tend to become more numerous. Some people inherit a tendency to develop a very large number of them; some people may have hundreds of them.      There is no easy way to remove multiple lesions on a single occasion.  Unless a specific lesion is \"inflamed\" and is causing pain or stinging/burning or is bleeding, most insurance companies do not authorize " "treatment.    XEROSIS (\"DRY SKIN\")    Physical Exam:  Anatomic Location Affected:  diffuse  Morphological Description:  xerosis  Pertinent Positives:  Pertinent Negatives:    Additional History of Present Condition:      Assessment and Plan:  Based on a thorough discussion of this condition and the management approach to it (including a comprehensive discussion of the known risks, side effects and potential benefits of treatment), the patient (family) agrees to implement the following specific plan:  Use moisturizer like Eucerin,Cerave or Aveeno Cream 3 times a day for the dry skin            Dry skin refers to skin that feels dry to touch. Dry skin has a dull surface with a rough, scaly quality. The skin is less pliable and cracked. When dryness is severe, the skin may become inflamed and fissured.  Although any body site can be dry, dry skin tends to affect the shins more than any other site.    Dry skin is lacking moisture in the outer horny cell layer (stratum corneum) and this results in cracks in the skin surface.  Dry skin is also called xerosis, xeroderma or asteatosis (lack of fat).  It can affect males and females of all ages. There is some racial variability in water and lipid content of the skin.  Dry skin that starts in early childhood may be one of about 20 types of ichthyosis (fish-scale skin). There is often a family history of dry skin.   Dry skin is commonly seen in people with atopic dermatitis.  Nearly everyone > 60 years has dry skin.    Dry skin that begins later may be seen in people with certain diseases and conditions.  Postmenopausal women  Hypothyroidism  Chronic renal disease   Malnutrition and weight loss   Subclinical dermatitis   Treatment with certain drugs such as oral retinoids, diuretics and epidermal growth factor receptor inhibitors      What is the treatment for dry skin?  The mainstay of treatment of dry skin and ichthyosis is moisturisers/emollients. They should be applied " liberally and often enough to:  Reduce itch   Improve the barrier function   Prevent entry of irritants, bacteria   Reduce transepidermal water loss.      How can dry skin be prevented?  Eliminate aggravating factors:  Reduce the frequency of bathing.   A humidifier in winter and air conditioner in summer   Compare having a short shower with a prolonged soak in a bath.   Use lukewarm, not hot, water.   Replace standard soap with a substitute such as a synthetic detergent cleanser, water-miscible emollient, bath oil, anti-pruritic tar oil, colloidal oatmeal etc.   Apply an emollient liberally and often, particularly shortly after bathing, and when itchy. The drier the skin, the thicker this should be, especially on the hands.    What is the outlook for dry skin?  A tendency to dry skin may persist life-long, or it may improve once contributing factors are controlled.       PSORIASIS    Physical Exam:  Anatomic Location: palms, soles, left ankle  Morphologic Description:  Pink patches with silvery scales   Pustules present? No  Severity: severe  Body Percent Affected: 10%  Pertinent Positives:  Itching? YES  Nail pitting? YES  Pertinent Negatives:    Additional History of Present Condition:  Patient reports being diagnosed with psoriasis as a teenager. Currently prescribed mometasone 0.1% cream. Located on palms and feet. Described as itchy.   Joint pain? No  Recent infection (strep throat)? No    Plan:  Discussed chronic nature of disease. Psoriasis tends to persist lifelong and fluctuates in extent and severity. To help manage the disease, decrease factors that aggravate psoriasis. This includes treating streptococcal infections, minimizing skin injuries, avoiding sun exposure if it exacerbates psoriasis, avoiding smoking and alcohol usage, decreasing stress, and maintaining an optimal body weight. Certain medications such as lithium, beta blockers, antimalarials, and NSAIDs have also been implicated. Suddenly  stopping oral steroids or strong topical steroids can cause rebound disease.   Dry skin is more susceptible to outbreaks of psoriasis. Practice moisturizing throughout the day and after bathing or showering to reduce itching, dryness and scaling.  Topical Therapy: Clobetasol 0.05% cream: Apply a thin layer to affected areas twice daily. Do not apply to face, armpits, or groin. Make sure to give your skin breaks to avoid skin thinning and stretch marks. Apply for two weeks with a one week break in between, or apply for five days with a two day break in between.  Discussed that if topical medication does not help with Psoriasis the next step would be injectable medication such as Otezla. Please send Dr. Montoya a My Chart message in 6 weeks with an update.      PROCEDURES PERFORMED TODAY ASSOCIATED WITH THIS CONDITION:          None     Medical Complexity:    CHRONIC ILLNESS (expected duration of >1 year):  EXACERBATION, PROGRESSION, OR SIDE EFFECTS OF TREATMENT.  Acutely worsening, poorly controlled, or progressing.  Intent is to control progression and requires additional supportive care or attention to treatment for side effects but not at level of consideration of hospital level of care.

## 2024-04-29 NOTE — PROGRESS NOTES
"[facility] Dermatology Clinic Note     Patient Name: Mirta Black  Encounter Date: 4/29/2024     Have you been cared for by a [facility] Dermatologist in the last 3 years and, if so, which description applies to you?    NO.   I am considered a \"new\" patient and must complete all patient intake questions. I am FEMALE/of child-bearing potential.    REVIEW OF SYSTEMS:  Have you recently had or currently have any of the following? Recent fever or chills? No  Any non-healing wound? YES, right foot  Are you pregnant or planning to become pregnant? No  Are you currently or planning to be nursing or breast feeding? No   PAST MEDICAL HISTORY:  Have you personally ever had or currently have any of the following?  If \"YES,\" then please provide more detail. Skin cancer (such as Melanoma, Basal Cell Carcinoma, Squamous Cell Carcinoma?  No  Tuberculosis, HIV/AIDS, Hepatitis B or C: No  Radiation Treatment No   HISTORY OF IMMUNOSUPPRESSION:   Do you have a history of any of the following:  Systemic Immunosuppression such as Diabetes, Biologic or Immunotherapy, Chemotherapy, Organ Transplantation, Bone Marrow Transplantation?  No    Answering \"YES\" requires the addition of the dotphrase \"IMMUNOSUPPRESSED\" as the first diagnosis of the patient's visit.   FAMILY HISTORY:  Any \"first degree relatives\" (parent, brother, sister, or child) with the following?    Skin Cancer, Pancreatic or Other Cancer? No   PATIENT EXPERIENCE:    Do you want the Dermatologist to perform a COMPLETE skin exam today including a clinical examination under the \"bra and underwear\" areas?  Yes  If necessary, do we have your permission to call and leave a detailed message on your Preferred Phone number that includes your specific medical information?  Yes      Allergies   Allergen Reactions   • Diphenhydramine Hives     Reaction Date: 18Apr2005;    • Doxycycline Diarrhea   • Penicillins Hives     Reaction Date: 18Apr2005;       Current Outpatient Medications: "   •  albuterol (Ventolin HFA) 90 mcg/act inhaler, Inhale 2 puffs every 6 (six) hours as needed for wheezing, Disp: 54 g, Rfl: 3  •  ALPRAZolam (XANAX) 0.25 mg tablet, Take 1 tablet (0.25 mg total) by mouth daily at bedtime as needed for anxiety, Disp: 20 tablet, Rfl: 0  •  Budeson-Glycopyrrol-Formoterol (Breztri Aerosphere) 160-9-4.8 MCG/ACT AERO, Inhale 2 puffs 2 (two) times a day Rinse mouth after use., Disp: 32.1 g, Rfl: 0  •  calcipotriene (DOVONEX) 0.005 % cream, Apply topically 2 (two) times a day To affected areas on the weekends., Disp: 60 g, Rfl: 5  •  clobetasol (TEMOVATE) 0.05 % cream, Apply topically 2 (two) times a day To affected areas Monday through Friday, Disp: 60 g, Rfl: 5  •  fexofenadine (ALLEGRA) 180 MG tablet, Take 180 mg by mouth daily, Disp: , Rfl:   •  mometasone (ELOCON) 0.1 % cream, Apply topically daily, Disp: 45 g, Rfl: 0  •  mometasone (NASONEX) 50 mcg/act nasal spray, 2 sprays into each nostril daily as needed (nasal congestion), Disp: 51 g, Rfl: 3  •  nicotine (NICOTROL) 10 MG inhaler, Use 1 cartridge per hour as needed do not exceed 10 per day, Disp: 168 each, Rfl: 3  •  pantoprazole (PROTONIX) 40 mg tablet, Take 1 tablet (40 mg total) by mouth daily, Disp: 90 tablet, Rfl: 0  •  pseudoephedrine (SUDAFED) 30 mg tablet, Take 30 mg by mouth every 4 (four) hours as needed for congestion, Disp: , Rfl:   •  Triamcinolone Acetonide (Nasacort Allergy 24HR) 55 MCG/ACT nasal spray, 2 sprays by Each Nare route daily, Disp: , Rfl:           Whom besides the patient is providing clinical information about today's encounter?   NO ADDITIONAL HISTORIAN (patient alone provided history)    Physical Exam and Assessment/Plan by Diagnosis:    CHIEF COMPLAINT    59 year old male or female patient presents today for New Patient.  Patient has a No history of skin cancer. Patient is currently using Mometasone 0.1% Cream. Located today on palms of hands and feet. States was diagnosed with psoriasis when she  "was a teenager.     MELANOCYTIC NEVI (\"Moles\")    Physical Exam:  Anatomic Location Affected:   Mostly on sun-exposed areas of the trunk and extremities  Morphological Description:  Scattered, 1-4mm round to ovoid, symmetrical-appearing, even bordered, skin colored to dark brown macules/papules, mostly in sun-exposed areas  Pertinent Positives:  Pertinent Negatives:    Additional History of Present Condition:      Assessment and Plan:  Based on a thorough discussion of this condition and the management approach to it (including a comprehensive discussion of the known risks, side effects and potential benefits of treatment), the patient (family) agrees to implement the following specific plan:  When outside we recommend using a wide brim hat, sunglasses, long sleeve and pants, sunscreen with SPF 30+ with reapplication every 2 hours, or SPF specific clothing   Benign, reassured  Annual skin check     Melanocytic Nevi  Melanocytic nevi (\"moles\") are tan or brown, raised or flat areas of the skin which have an increased number of melanocytes. Melanocytes are the cells in our body which make pigment and account for skin color.    Some moles are present at birth (I.e., \"congenital nevi\"), while others come up later in life (i.e., \"acquired nevi\").  The sun can stimulate the body to make more moles.  Sunburns are not the only thing that triggers more moles.  Chronic sun exposure can do it too.     Clinically distinguishing a healthy mole from melanoma may be difficult, even for experienced dermatologists. The \"ABCDE's\" of moles have been suggested as a means of helping to alert a person to a suspicious mole and the possible increased risk of melanoma.  The suggestions for raising alert are as follows:    Asymmetry: Healthy moles tend to be symmetric, while melanomas are often asymmetric.  Asymmetry means if you draw a line through the mole, the two halves do not match in color, size, shape, or surface texture. Asymmetry " "can be a result of rapid enlargement of a mole, the development of a raised area on a previously flat lesion, scaling, ulceration, bleeding or scabbing within the mole.  Any mole that starts to demonstrate \"asymmetry\" should be examined promptly by a board certified dermatologist.     Border: Healthy moles tend to have discrete, even borders.  The border of a melanoma often blends into the normal skin and does not sharply delineate the mole from normal skin.  Any mole that starts to demonstrate \"uneven borders\" should be examined promptly by a board certified dermatologist.     Color: Healthy moles tend to be one color throughout.  Melanomas tend to be made up of different colors ranging from dark black, blue, white, or red.  Any mole that demonstrates a color change should be examined promptly by a board certified dermatologist.     Diameter: Healthy moles tend to be smaller than 0.6 cm in size; an exception are \"congenital nevi\" that can be larger.  Melanomas tend to grow and can often be greater than 0.6 cm (1/4 of an inch, or the size of a pencil eraser). This is only a guideline, and many normal moles may be larger than 0.6 cm without being unhealthy.  Any mole that starts to change in size (small to bigger or bigger to smaller) should be examined promptly by a board certified dermatologist.     Evolving: Healthy moles tend to \"stay the same.\"  Melanomas may often show signs of change or evolution such as a change in size, shape, color, or elevation.  Any mole that starts to itch, bleed, crust, burn, hurt, or ulcerate or demonstrate a change or evolution should be examined promptly by a board certified dermatologist.        LENTIGO    Physical Exam:  Anatomic Location Affected:  trunk, arms  Morphological Description:  Light brown macules  Pertinent Positives:  Pertinent Negatives:    Additional History of Present Condition:      Assessment and Plan:  Based on a thorough discussion of this condition and the " management approach to it (including a comprehensive discussion of the known risks, side effects and potential benefits of treatment), the patient (family) agrees to implement the following specific plan:  When outside we recommend using a wide brim hat, sunglasses, long sleeve and pants, sunscreen with SPF 30+ with reapplication every 2 hours, or SPF specific clothing       What is a lentigo?  A lentigo is a pigmented flat or slightly raised lesion with a clearly defined edge. Unlike an ephelis (freckle), it does not fade in the winter months. There are several kinds of lentigo.  The name lentigo originally referred to its appearance resembling a small lentil. The plural of lentigo is lentigines, although “lentigos” is also in common use.    Who gets lentigines?  Lentigines can affect males and females of all ages and races. Solar lentigines are especially prevalent in fair skinned adults. Lentigines associated with syndromes are present at birth or arise during childhood.    What causes lentigines?  Common forms of lentigo are due to exposure to ultraviolet radiation:  Sun damage including sunburn   Indoor tanning   Phototherapy, especially photochemotherapy (PUVA)    Ionizing radiation, eg radiation therapy, can also cause lentigines.  Several familial syndromes associated with widespread lentigines originate from mutations in Be-MAP kinase, mTOR signaling and PTEN pathways.    What is the treatment for lentigines?  Most lentigines are left alone. Attempts to lighten them may not be successful. The following approaches are used:  SPF 50+ broad-spectrum sunscreen   Hydroquinone bleaching cream   Alpha hydroxy acids   Vitamin C   Retinoids   Azelaic acid   Chemical peels  Individual lesions can be permanently removed using:  Cryotherapy   Intense pulsed light   Pigment lasers    How can lentigines be prevented?  Lentigines associated with exposure ultraviolet radiation can be prevented by very careful sun  "protection. Clothing is more successful at preventing new lentigines than are sunscreens.    What is the outlook for lentigines?  Lentigines usually persist. They may increase in number with age and sun exposure. Some in sun-protected sites may fade and disappear.    DWYER ANGIOMAS    Physical Exam:  Anatomic Location Affected:  trunk  Morphological Description:  Scattered cherry red, 1-4 mm papules.  Pertinent Positives:  Pertinent Negatives:    Additional History of Present Condition:      Assessment and Plan:  Based on a thorough discussion of this condition and the management approach to it (including a comprehensive discussion of the known risks, side effects and potential benefits of treatment), the patient (family) agrees to implement the following specific plan:  Monitor for changes  Benign, reassured      Assessment and Plan:    Cherry angioma, also known as Wright de Sean spots, are benign vascular skin lesions. A \"cherry angioma\" is a firm red, blue or purple papule, 0.1-1 cm in diameter. When thrombosed, they can appear black in colour until evaluated with a dermatoscope when the red or purple colour is more easily seen. Cherry angioma may develop on any part of the body but most often appear on the scalp, face, lips and trunk.  An angioma is due to proliferating endothelial cells; these are the cells that line the inside of a blood vessel.    Angiomas can arise in early life or later in life; the most common type of angioma is a cherry angioma.  Cherry angiomas are very common in males and females of any age or race. They are more noticeable in white skin than in skin of colour. They markedly increase in number from about the age of 40. There may be a family history of similar lesions. Eruptive cherry angiomas have been rarely reported to be associated with internal malignancy. The cause of angiomas is unknown. Genetic analysis of cherry angiomas has shown that they frequently carry specific " "somatic missense mutations in the GNAQ and GNA11 (Q209H) genes, which are involved in other vascular and melanocytic proliferations.      SEBORRHEIC KERATOSIS; NON-INFLAMED    Physical Exam:  Anatomic Location Affected:  trunk  Morphological Description:  Flat and raised, waxy, smooth to warty textured, yellow to brownish-grey to dark brown to blackish, discrete, \"stuck-on\" appearing papules.  Pertinent Positives:  Pertinent Negatives:    Additional History of Present Condition:      Assessment and Plan:  Based on a thorough discussion of this condition and the management approach to it (including a comprehensive discussion of the known risks, side effects and potential benefits of treatment), the patient (family) agrees to implement the following specific plan:  Monitor for changes  Benign, reassured      Seborrheic Keratosis  A seborrheic keratosis is a harmless warty spot that appears during adult life as a common sign of skin aging.  Seborrheic keratoses can arise on any area of skin, covered or uncovered, with the usual exception of the palms and soles. They do not arise from mucous membranes. Seborrheic keratoses can have highly variable appearance.      Seborrheic keratoses are extremely common. It has been estimated that over 90% of adults over the age of 60 years have one or more of them. They occur in males and females of all races, typically beginning to erupt in the 30s or 40s. They are uncommon under the age of 20 years.  The precise cause of seborrhoeic keratoses is not known.  Seborrhoeic keratoses are considered degenerative in nature. As time goes by, seborrheic keratoses tend to become more numerous. Some people inherit a tendency to develop a very large number of them; some people may have hundreds of them.      There is no easy way to remove multiple lesions on a single occasion.  Unless a specific lesion is \"inflamed\" and is causing pain or stinging/burning or is bleeding, most insurance " "companies do not authorize treatment.    XEROSIS (\"DRY SKIN\")    Physical Exam:  Anatomic Location Affected:  diffuse  Morphological Description:  xerosis  Pertinent Positives:  Pertinent Negatives:    Additional History of Present Condition:      Assessment and Plan:  Based on a thorough discussion of this condition and the management approach to it (including a comprehensive discussion of the known risks, side effects and potential benefits of treatment), the patient (family) agrees to implement the following specific plan:  Use moisturizer like Eucerin,Cerave or Aveeno Cream 3 times a day for the dry skin            Dry skin refers to skin that feels dry to touch. Dry skin has a dull surface with a rough, scaly quality. The skin is less pliable and cracked. When dryness is severe, the skin may become inflamed and fissured.  Although any body site can be dry, dry skin tends to affect the shins more than any other site.    Dry skin is lacking moisture in the outer horny cell layer (stratum corneum) and this results in cracks in the skin surface.  Dry skin is also called xerosis, xeroderma or asteatosis (lack of fat).  It can affect males and females of all ages. There is some racial variability in water and lipid content of the skin.  Dry skin that starts in early childhood may be one of about 20 types of ichthyosis (fish-scale skin). There is often a family history of dry skin.   Dry skin is commonly seen in people with atopic dermatitis.  Nearly everyone > 60 years has dry skin.    Dry skin that begins later may be seen in people with certain diseases and conditions.  Postmenopausal women  Hypothyroidism  Chronic renal disease   Malnutrition and weight loss   Subclinical dermatitis   Treatment with certain drugs such as oral retinoids, diuretics and epidermal growth factor receptor inhibitors      What is the treatment for dry skin?  The mainstay of treatment of dry skin and ichthyosis is moisturisers/emollients. " They should be applied liberally and often enough to:  Reduce itch   Improve the barrier function   Prevent entry of irritants, bacteria   Reduce transepidermal water loss.      How can dry skin be prevented?  Eliminate aggravating factors:  Reduce the frequency of bathing.   A humidifier in winter and air conditioner in summer   Compare having a short shower with a prolonged soak in a bath.   Use lukewarm, not hot, water.   Replace standard soap with a substitute such as a synthetic detergent cleanser, water-miscible emollient, bath oil, anti-pruritic tar oil, colloidal oatmeal etc.   Apply an emollient liberally and often, particularly shortly after bathing, and when itchy. The drier the skin, the thicker this should be, especially on the hands.    What is the outlook for dry skin?  A tendency to dry skin may persist life-long, or it may improve once contributing factors are controlled.       PSORIASIS    Physical Exam:  Anatomic Location:  palms, soles, left ankle  Morphologic Description:  Pink patches with silvery scales   Pustules present? No  Severity: severe  Body Percent Affected: 10%  Pertinent Positives:  Itching? YES  Nail pitting? YES  Pertinent Negatives:    Additional History of Present Condition:  Patient reports being diagnosed with psoriasis as a teenager. Currently prescribed mometasone 0.1% cream. Located on palms and feet. Described as itchy.   Joint pain? No  Recent infection (strep throat)? No    Plan:  Discussed chronic nature of disease. Psoriasis tends to persist lifelong and fluctuates in extent and severity. To help manage the disease, decrease factors that aggravate psoriasis. This includes treating streptococcal infections, minimizing skin injuries, avoiding sun exposure if it exacerbates psoriasis, avoiding smoking and alcohol usage, decreasing stress, and maintaining an optimal body weight. Certain medications such as lithium, beta blockers, antimalarials, and NSAIDs have also been  implicated. Suddenly stopping oral steroids or strong topical steroids can cause rebound disease.   Dry skin is more susceptible to outbreaks of psoriasis. Practice moisturizing throughout the day and after bathing or showering to reduce itching, dryness and scaling.  Topical Therapy: Clobetasol 0.05% cream: Apply a thin layer to affected areas twice daily. Do not apply to face, armpits, or groin. Make sure to give your skin breaks to avoid skin thinning and stretch marks. Apply for two weeks with a one week break in between, or apply for five days with a two day break in between.  Discussed that if topical medication does not help with Psoriasis the next step would be injectable medication such as Otezla. Please send Dr. Montoya a My Chart message in 6 weeks with an update.      PROCEDURES PERFORMED TODAY ASSOCIATED WITH THIS CONDITION:          None     Medical Complexity:    CHRONIC ILLNESS (expected duration of >1 year):  EXACERBATION, PROGRESSION, OR SIDE EFFECTS OF TREATMENT.  Acutely worsening, poorly controlled, or progressing.  Intent is to control progression and requires additional supportive care or attention to treatment for side effects but not at level of consideration of hospital level of care.      Scribe Attestation    I,:  Julia Corey am acting as a scribe while in the presence of the attending physician.:       I,:  Lindsay Franz MD personally performed the services described in this documentation    as scribed in my presence.:

## 2024-06-26 DIAGNOSIS — J30.2 SEASONAL ALLERGIC RHINITIS, UNSPECIFIED TRIGGER: ICD-10-CM

## 2024-06-26 RX ORDER — MOMETASONE FUROATE MONOHYDRATE 50 UG/1
2 SPRAY, METERED NASAL DAILY PRN
Qty: 51 G | Refills: 1 | Status: SHIPPED | OUTPATIENT
Start: 2024-06-26

## 2024-07-20 DIAGNOSIS — J43.2 CENTRILOBULAR EMPHYSEMA (HCC): ICD-10-CM

## 2024-07-21 RX ORDER — ALBUTEROL SULFATE 90 UG/1
2 AEROSOL, METERED RESPIRATORY (INHALATION) EVERY 6 HOURS PRN
Qty: 54 G | Refills: 0 | Status: SHIPPED | OUTPATIENT
Start: 2024-07-21

## 2024-07-25 DIAGNOSIS — K21.9 GASTROESOPHAGEAL REFLUX DISEASE WITHOUT ESOPHAGITIS: ICD-10-CM

## 2024-07-25 RX ORDER — PANTOPRAZOLE SODIUM 40 MG/1
40 TABLET, DELAYED RELEASE ORAL DAILY
Qty: 100 TABLET | Refills: 1 | Status: SHIPPED | OUTPATIENT
Start: 2024-07-25

## 2024-07-30 ENCOUNTER — OFFICE VISIT (OUTPATIENT)
Dept: OBGYN CLINIC | Facility: CLINIC | Age: 59
End: 2024-07-30
Payer: COMMERCIAL

## 2024-07-30 VITALS
BODY MASS INDEX: 31.32 KG/M2 | DIASTOLIC BLOOD PRESSURE: 72 MMHG | WEIGHT: 170.2 LBS | SYSTOLIC BLOOD PRESSURE: 130 MMHG | HEIGHT: 62 IN

## 2024-07-30 DIAGNOSIS — Z12.4 ENCOUNTER FOR SCREENING FOR MALIGNANT NEOPLASM OF CERVIX: ICD-10-CM

## 2024-07-30 DIAGNOSIS — L02.214 ABSCESS OF RIGHT GROIN: ICD-10-CM

## 2024-07-30 DIAGNOSIS — Z01.419 WELL WOMAN EXAM WITH ROUTINE GYNECOLOGICAL EXAM: Primary | ICD-10-CM

## 2024-07-30 DIAGNOSIS — Z11.51 SCREENING FOR HPV (HUMAN PAPILLOMAVIRUS): ICD-10-CM

## 2024-07-30 PROCEDURE — S0610 ANNUAL GYNECOLOGICAL EXAMINA: HCPCS

## 2024-07-30 PROCEDURE — G0476 HPV COMBO ASSAY CA SCREEN: HCPCS

## 2024-07-30 PROCEDURE — G0145 SCR C/V CYTO,THINLAYER,RESCR: HCPCS

## 2024-07-30 RX ORDER — CLINDAMYCIN PHOSPHATE 11.9 MG/ML
SOLUTION TOPICAL 2 TIMES DAILY PRN
Qty: 30 ML | Refills: 1 | Status: SHIPPED | OUTPATIENT
Start: 2024-07-30

## 2024-07-30 NOTE — PROGRESS NOTES
ASSESSMENT & PLAN:   Diagnoses and all orders for this visit:    Encounter for screening for malignant neoplasm of cervix  -     Liquid-based pap, screening    Screening for HPV (human papillomavirus)  -     Liquid-based pap, screening    Well woman exam with routine gynecological exam  -     Liquid-based pap, screening    Abscess of right groin  -     clindamycin (CLEOCIN T) 1 % external solution; Apply topically 2 (two) times a day as needed (for skin infection)    The following were reviewed in today's visit: ASCCP guidelines, Gardisil vaccination, STD testing breast self exam, STD testing, exercise, and healthy diet.    Patient to return to office in yearly for annual exam.     All questions have been answered to her satisfaction.    CC:  Annual Gynecologic Examination  Chief Complaint   Patient presents with    Gynecologic Exam     Pt presents for her yearly exam.  Mammo 22, scheduled 10/12/24  Colonoscopy 22, repeat 3 years         HPI: Mirta Black is a 59 y.o.  who presents for annual gynecologic examination.  She has the following concerns: rash in groin skin folds. She believes it is an infected hair follicle or due to increased sweating in the area. She has had other similar rashes in between her thighs.   Mammogram done 2022, BIRADS1. Patient had breast MRI done 2023, BIRADS1. Mammogram scheduled for 10/12/2024.     Health Maintenance:    Exercise: intermittently  Breast exams/breast awareness: yes  Last mammogram: 2022, BIRADS1  Colorectal cancer screening: colonoscopy 2022, repeat 3 years    Past Medical History:   Diagnosis Date    Abnormal Pap smear of cervix     ASCUS 10 yrs ago    Allergic rhinitis     Resolved 2017     BRCA1 negative     Chronic sinusitis     Resolved 2017     COPD (chronic obstructive pulmonary disease) (HCC) 2000    Dx by [facility] due to history of smoking    COVID-19 01/10/2022    Emphysema lung (HCC)     Empyema  (HCC)     Genital warts     years ago    GERD (gastroesophageal reflux disease)     Mitral valve prolapse     Palpitations     Last assessed 2006     Skin disorder     Resolved 2017     Skin lesions     Resolved 2017      Past Surgical History:   Procedure Laterality Date    BREAST BIOPSY Bilateral     beningn    ENDOMETRIAL ABLATION      LIPOMA RESECTION      arm    TUBAL LIGATION       Past OB/Gyn History:   No LMP recorded. Patient is postmenopausal.    Menopausal status: postmenopausal  Menopausal symptoms: None    Last Pap: ~2019 : no abnormalities  History of abnormal Pap smear: yes - ASCUS, HPV+    Patient is not currently sexually active.   STD testing: no  Current contraception: post menopausal status    Family History  Family History   Problem Relation Age of Onset    Hypertension Mother     Mitral valve prolapse Father     Heart disease Father         had heart attack  and surgery Hx of MVP    Stroke Father         while in the hospital for heart surgery    Hypertension Father     Breast cancer Sister 42         age 43    Cancer Sister         breast ca  age 43    Uterine cancer Paternal Grandmother     Cancer Paternal Grandmother         post menopausal bleeding, was told the cancer was everywhere Diedage 55    Colon cancer Paternal Grandfather          age 65    Cancer Paternal Grandfather         colon CA   age 65    Hypertension Brother      Family history of uterine or ovarian cancer: yes, uterine - paternal grandmother  Family history of breast cancer: yes, sister  Family history of colon cancer: yes, paternal grandfather, nephew    Social History:  Social History     Socioeconomic History    Marital status: /Civil Union     Spouse name: Not on file    Number of children: Not on file    Years of education: Not on file    Highest education level: Not on file   Occupational History    Not on file   Tobacco Use    Smoking status: Some Days     Current packs/day: 0.00      Average packs/day: 0.5 packs/day for 45.9 years (23.0 ttl pk-yrs)     Types: Cigarettes     Start date:      Last attempt to quit: 2021     Years since quittin.6     Passive exposure: Never    Smokeless tobacco: Never    Tobacco comments:     I still have some days I have had 1 cigarette   Vaping Use    Vaping status: Never Used   Substance and Sexual Activity    Alcohol use: Not Currently    Drug use: Never    Sexual activity: Not Currently     Partners: Male     Birth control/protection: Post-menopausal, Female Sterilization   Other Topics Concern    Not on file   Social History Narrative    Most recent tobacco use screenin2020     Social Determinants of Health     Financial Resource Strain: Not on file   Food Insecurity: Not on file   Transportation Needs: Not on file   Physical Activity: Not on file   Stress: Not on file   Social Connections: Not on file   Intimate Partner Violence: Not on file   Housing Stability: Not on file     Domestic violence screen: negative    Allergies:  Allergies   Allergen Reactions    Diphenhydramine Hives     Reaction Date: 2005;     Doxycycline Diarrhea    Penicillins Hives     Reaction Date: 2005;      Medications:    Current Outpatient Medications:     albuterol (Ventolin HFA) 90 mcg/act inhaler, Inhale 2 puffs every 6 (six) hours as needed for wheezing, Disp: 54 g, Rfl: 0    ALPRAZolam (XANAX) 0.25 mg tablet, Take 1 tablet (0.25 mg total) by mouth daily at bedtime as needed for anxiety, Disp: 20 tablet, Rfl: 0    Budeson-Glycopyrrol-Formoterol (Breztri Aerosphere) 160-9-4.8 MCG/ACT AERO, Inhale 2 puffs 2 (two) times a day Rinse mouth after use., Disp: 32.1 g, Rfl: 0    calcipotriene (DOVONEX) 0.005 % cream, Apply topically 2 (two) times a day To affected areas on the weekends., Disp: 60 g, Rfl: 5    clindamycin (CLEOCIN T) 1 % external solution, Apply topically 2 (two) times a day as needed (for skin infection), Disp: 30 mL, Rfl: 1    clobetasol  "(TEMOVATE) 0.05 % cream, Apply topically 2 (two) times a day To affected areas Monday through Friday, Disp: 60 g, Rfl: 5    fexofenadine (ALLEGRA) 180 MG tablet, Take 180 mg by mouth daily, Disp: , Rfl:     mometasone (NASONEX) 50 mcg/act nasal spray, 2 sprays into each nostril daily as needed (nasal congestion), Disp: 51 g, Rfl: 1    nicotine (NICOTROL) 10 MG inhaler, Use 1 cartridge per hour as needed do not exceed 10 per day, Disp: 168 each, Rfl: 3    pantoprazole (PROTONIX) 40 mg tablet, Take 1 tablet (40 mg total) by mouth daily, Disp: 100 tablet, Rfl: 1    pseudoephedrine (SUDAFED) 30 mg tablet, Take 30 mg by mouth every 4 (four) hours as needed for congestion, Disp: , Rfl:     Triamcinolone Acetonide (Nasacort Allergy 24HR) 55 MCG/ACT nasal spray, 2 sprays by Each Nare route daily, Disp: , Rfl:     Review of Systems:  Review of Systems   Constitutional:  Negative for chills and fever.   Respiratory:  Negative for shortness of breath.    Cardiovascular:  Negative for chest pain.   Gastrointestinal:  Negative for abdominal pain, constipation and diarrhea.   Genitourinary:  Negative for dysuria, frequency, pelvic pain, vaginal discharge and vaginal pain.   Psychiatric/Behavioral:  Negative for self-injury and suicidal ideas.          Physical Exam:  /72 (BP Location: Left arm, Patient Position: Sitting, Cuff Size: Standard)   Ht 5' 2\" (1.575 m)   Wt 77.2 kg (170 lb 3.2 oz)   BMI 31.13 kg/m²    Physical Exam  Constitutional:       Appearance: Normal appearance.   Genitourinary:      Vulva and urethral meatus normal.      No labial fusion noted.      No vaginal erythema or tenderness.        Right Adnexa: not tender.     Left Adnexa: not tender.     No cervical discharge or friability.      Uterus is not tender.   Breasts:     Breasts are symmetrical.      Right: Normal.      Left: Normal.   HENT:      Head: Normocephalic and atraumatic.   Neck:      Thyroid: No thyroid mass or thyroid tenderness. "   Cardiovascular:      Rate and Rhythm: Normal rate and regular rhythm.      Heart sounds: Normal heart sounds. No murmur heard.  Pulmonary:      Effort: Pulmonary effort is normal.      Breath sounds: Normal breath sounds. No wheezing.   Abdominal:      Palpations: Abdomen is soft. There is no mass.      Tenderness: There is no abdominal tenderness.       Lymphadenopathy:      Cervical: No cervical adenopathy.   Neurological:      General: No focal deficit present.      Mental Status: She is alert and oriented to person, place, and time.   Skin:     General: Skin is warm and dry.   Psychiatric:         Mood and Affect: Mood normal.         Behavior: Behavior normal.   Vitals and nursing note reviewed.

## 2024-07-31 LAB
HPV HR 12 DNA CVX QL NAA+PROBE: NEGATIVE
HPV16 DNA CVX QL NAA+PROBE: NEGATIVE
HPV18 DNA CVX QL NAA+PROBE: NEGATIVE

## 2024-08-05 LAB
LAB AP GYN PRIMARY INTERPRETATION: NORMAL
Lab: NORMAL

## 2024-08-07 ENCOUNTER — TELEPHONE (OUTPATIENT)
Dept: PULMONOLOGY | Facility: MEDICAL CENTER | Age: 59
End: 2024-08-07

## 2024-10-12 ENCOUNTER — HOSPITAL ENCOUNTER (OUTPATIENT)
Dept: RADIOLOGY | Facility: HOSPITAL | Age: 59
Discharge: HOME | End: 2024-10-12
Payer: COMMERCIAL

## 2024-10-12 DIAGNOSIS — Z12.31 ENCOUNTER FOR SCREENING MAMMOGRAM FOR BREAST CANCER: ICD-10-CM

## 2024-10-12 PROCEDURE — 77067 SCR MAMMO BI INCL CAD: CPT

## 2024-10-12 PROCEDURE — 77063 BREAST TOMOSYNTHESIS BI: CPT

## 2024-10-17 DIAGNOSIS — R92.8 ABNORMAL MAMMOGRAM: Primary | ICD-10-CM

## 2024-10-17 NOTE — RESULT ENCOUNTER NOTE
Called patient to discuss results of the mammo  Got machine.  Left message for patient to call back.  Imaging of the breast is incomplete they need further imaging to help clear the breast radiology is suggesting a ultrasound of the left breast.  I will place order for this study okay to relay message when patient calls back thank you

## 2024-10-28 ENCOUNTER — HOSPITAL ENCOUNTER (OUTPATIENT)
Dept: RADIOLOGY | Facility: HOSPITAL | Age: 59
Discharge: HOME | End: 2024-10-28
Attending: FAMILY MEDICINE
Payer: COMMERCIAL

## 2024-10-28 DIAGNOSIS — R92.8 ABNORMAL MAMMOGRAM: ICD-10-CM

## 2024-10-28 PROCEDURE — 76642 ULTRASOUND BREAST LIMITED: CPT

## 2024-10-28 NOTE — PROGRESS NOTES
Met with patient Mirta, and radiologist Dr. Lopez regarding recommendation for:     _____ Right ___x___ Left      __x__ Ultrasound guided breast biopsy    _____ Stereotactic breast biopsy.      __x___Verbalized understanding.      Blood thinners:  _____ yes ___x___ no    Date stopped: (not applicable)    Biopsy teaching sheet given:  __x___ yes _____ no    Tentative biopsy appointment: Monday 11/04/2024 @ 1pm [facility] (check-in time @ 12:45pm).    Our breast center is located at [address] [place] [address]. The breast center is located on the first floor/ main entry level of [facility]. Please check in @ the main entrance/ main lobby of the hospital for your outpatient procedure.    Patient is aware she does not need to fast for a breast biopsy procedure, no special soap/ surgical scrubs need to be used the night before or morning of her procedure, and no changes need to be made to her normal medication schedule. Wear a comfortable two piece outfit to your appointment, and wear/ or bring a bra with you to your appointment as this will help hold the ice pack in place that we would like you to use post-procedure.

## 2024-10-29 ENCOUNTER — TELEPHONE (OUTPATIENT)
Dept: FAMILY MEDICINE CLINIC | Facility: CLINIC | Age: 59
End: 2024-10-29

## 2024-10-29 NOTE — TELEPHONE ENCOUNTER
Called pt dioscussed result of breast US.  PT has biopsy already sched.  Breast center discussed results with the pt already.  PT has no questions

## 2024-11-01 DIAGNOSIS — J43.2 CENTRILOBULAR EMPHYSEMA (HCC): ICD-10-CM

## 2024-11-01 RX ORDER — BUDESONIDE, GLYCOPYRROLATE, AND FORMOTEROL FUMARATE 160; 9; 4.8 UG/1; UG/1; UG/1
2 AEROSOL, METERED RESPIRATORY (INHALATION) 2 TIMES DAILY
Qty: 32.1 G | Refills: 0 | Status: SHIPPED | OUTPATIENT
Start: 2024-11-01

## 2024-11-01 NOTE — TELEPHONE ENCOUNTER
Critical Care Consultation    Date of consult: 3/5/2022    Referring Physician  Arminda Amador M.D.    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   Patient was treated with outpatient antibiotics for long time and has been on suppressive, medications for MSSA since that time.     Patient came in to ED with hypoxemia tachypnea, altered mental status, patient severe altered mental status and became obtunded, was intubated for airway protection, was noted to have been moving all extremities prior to intubation, but was not answering questions or following commands.  GCS was 9 initially, decreased down to 5 prior to intubation.     CTA of the chest showed multiple PEs throughout and possible pulmonary infarct, right heart somewhat dilated (per ED physician on bedside ultrasound right heart had good function) and aortic thrombus, with renal and splenic infarcts, CT head showing old right frontal encephalomalacia and area of edema in right cerebellum possibly due to subacute stroke.   CTA without large vessel occlusion, CT perfusion with some areas of acute stroke.   MRI head was done discussed with radiology no acute hemorrhagic conversion at this time.     Discussed extensively with neurology as patient had recent strokes and now multiple strokes are likely's of septic emboli origin(which have much higher risk of hemorrhagic conversion) would not be a TPA candidate , also very concerned about starting any type of anticoagulation but understands with systemic symptoms may need.  Recommended heparin drip without bolus if systemic anticoagulation is  Last OV 9/29/2023 required Maintain systolic blood pressure between 110-185    Also discussed with IR as patient with massive PE (was requiring small dose pressors 1-3 nor epi (but this is also to maintain blood pressure recommended by neurology), initially responsive to fluids, and some right heart dilation mild Trop, and BNP increase, patient's heart rate had improved, and bedside ultrasound did not show extensive dilated right heart or D sign.  Did not recommend for EKOS, or thrombectomy at this time however if patient hemodynamics worsened believed that patient should have this procedure.  We will monitor closely for any signs of worsening hemodynamics    Also ordered lower extremity Dopplers, as if patient has extensive thrombus and with needing to take a more conservative approach with anticoagulation ?  If IVC filter would be beneficial, however in setting of likely bacteremia and septic emboli this may not be recommended.     Discussed also with vascular surgery about aortic thrombus with renal and splenic infarcts, very strongly recommend starting a heparin drip but agreed for a no bolus drip to help protect brain from hemorrhage, will see in the a.m. however neurologic status overall will be determining factor in if vascular surgery should be pursued.   Arterial Dopplers ordered for lower extremity and left upper extremity as patient had been complaining of symptoms that seemed to possibly correlate with arterial thrombus.     Had long discussion with both wife and son that patient has extensive disease and very poor mental status with multiple strokes unclear how well patient will improve, and if patient codes very unlikely he will survive.  At this time family wants to remain full code and want to be able to talk to the specialist and see how he does over the next couple days, will need to have continued conversations on CODE STATUS and overall prognosis as this progresses.       Code Status  Full Code    Review of  Systems  ROS unable to assess patient intubated and obtunded    Past Medical History  Hypertension hyperlipidemia embolic CVA(septic)     Surgical History  Lumbar fusion complicated by wound infection possible hardware infection status post washout    Family History  Noncontributory  Social History  Former smoker quit 1980, chews tobacco  No alcohol use no other drug use  Medications  Home Medications    **Home medications have not yet been reviewed for this encounter**       Current Facility-Administered Medications   Medication Dose Route Frequency Provider Last Rate Last Admin   • MD Alert...Vancomycin per Pharmacy   Other PHARMACY TO DOSE Arminda Amador M.D.       • cefepime (Maxipime) 2 g in dextrose 5% 20 mL IV syringe  2 g Intravenous Q8HRS Arminda Amador M.D.   Stopped at 03/05/22 0119   • vancomycin (VANCOCIN) 750 mg in  mL IVPB  750 mg Intravenous Q12HR Arminda Amador M.D.       • Respiratory Therapy Consult   Nebulization Continuous RT Arminda Amador M.D.       • famotidine (PEPCID) tablet 20 mg  20 mg Enteral Tube Q12HRS Arminda Amador M.D.        Or   • famotidine (PEPCID) injection 20 mg  20 mg Intravenous Q12HRS Arminda Amador M.D.       • senna-docusate (PERICOLACE or SENOKOT S) 8.6-50 MG per tablet 2 Tablet  2 Tablet Enteral Tube BID Arminda Amador M.D.        And   • polyethylene glycol/lytes (MIRALAX) PACKET 1 Packet  1 Packet Enteral Tube QDAY PRN Arminda Amador M.D.        And   • magnesium hydroxide (MILK OF MAGNESIA) suspension 30 mL  30 mL Enteral Tube QDAY PRN Arminda Amador M.D.        And   • bisacodyl (DULCOLAX) suppository 10 mg  10 mg Rectal QDAY PRN Arminda Amador M.D.       • MD Alert...ICU Electrolyte Replacement per Pharmacy   Other PHARMACY TO DOSE Arminda Amador M.D.       • lidocaine (XYLOCAINE) 1 % injection 2 mL  2 mL Tracheal Tube Q30 MIN PRN Arminda Amador M.D.       • fentaNYL (SUBLIMAZE) injection 50 mcg  50 mcg Intravenous Q15 MIN  PRN Arminda Amador M.D.        And   • fentaNYL (SUBLIMAZE) injection 100 mcg  100 mcg Intravenous Q15 MIN PRN Arminda Amador M.D.        And   • fentaNYL (SUBLIMAZE) 50 mcg/mL in 50mL (Continuous Infusion)   Intravenous Continuous Arminda Amador M.D. 2 mL/hr at 03/05/22 0456 100 mcg/hr at 03/05/22 0456    And   • dexmedetomidine (Precedex) 400 mcg/100mL D5W premix infusion  0-0.7 mcg/kg/hr Intravenous Continuous Arminda Amador M.D. 4.7 mL/hr at 03/05/22 0528 0.2 mcg/kg/hr at 03/05/22 0528   • insulin regular (HumuLIN R,NovoLIN R) injection  1-6 Units Subcutaneous Q6HRS Arminda Amador M.D.        And   • dextrose 50% (D50W) injection 25 g  25 g Intravenous Q15 MIN PRN Arminda Amador M.D.       • heparin infusion 25,000 units in 500 mL 0.45% NACL  0-30 Units/kg/hr Intravenous Continuous Arminda Amador M.D. 22.4 mL/hr at 03/05/22 0515 12 Units/kg/hr at 03/05/22 0515   • Allow for permissive hypertension: SBP up to 220 mmHg/DBP to 120 mmHg; If SBP greater than 220 mmHg or DBP greater than 120 mmHg administer PRN antihypertensive medications.   Other PHARMACY TO DOSE Arminda Amador M.D.       • hydrALAZINE (APRESOLINE) injection 10 mg  10 mg Intravenous Q2HRS PRN Arminda Amador M.D.       • NALOXONE HCL 2 MG/2ML INJ SOSY            • lidocaine (XYLOCAINE) 2 % injection 20 mL  20 mL Other Once Jeremy Talavera M.D.       • PIPERACILLIN SOD-TAZOBACTAM SO 3.375 (3-0.375) G IV SOLR            • SODIUM CHLORIDE 0.9 % IV SOLN        Dose not Required at 03/04/22 2315   • KETAMINE HCL 50 MG/ML INJ SOLN            • norepinephrine (Levophed) 8 mg in 250 mL NS infusion (premix)  0-30 mcg/min Intravenous Continuous Arminda Amador M.D. 11.3 mL/hr at 03/05/22 0400 6 mcg/min at 03/05/22 0400       Allergies  No Known Allergies    Vital Signs last 24 hours  Temp:  [36.1 °C (97 °F)-36.4 °C (97.6 °F)] 36.1 °C (97 °F)  Pulse:  [] 60  Resp:  [18-98] 25  BP: ()/(47-95) 131/83  SpO2:  [89 %-100  %] 93 %    Physical Exam  Physical Exam  Constitutional:       Appearance: He is normal weight. He is ill-appearing. He is not diaphoretic.      Interventions: He is intubated.      Comments: Obtunded   HENT:      Head: Normocephalic and atraumatic.      Mouth/Throat:      Mouth: Mucous membranes are dry.   Eyes:      Pupils: Pupils are equal, round, and reactive to light.   Cardiovascular:      Rate and Rhythm: Regular rhythm. Tachycardia present.      Pulses: Normal pulses.   Pulmonary:      Effort: Respiratory distress present. He is intubated.      Breath sounds: Normal breath sounds. No wheezing or rhonchi.      Comments: Respiratory distress tachypneic up to the 40s prior to intubation small brightness  Abdominal:      General: Abdomen is flat.      Palpations: Abdomen is soft.   Musculoskeletal:      Left lower leg: Edema present.      Comments: Swelling of left lower extremity which is colder than the right also pulses were palpable right upper extremity also noted slight increase in swelling.    Skin:     General: Skin is warm.      Capillary Refill: Capillary refill takes 2 to 3 seconds.      Comments: No petechiae or Janeway lesions noted   Neurological:      Comments: Patient was obtunded initially GCS of 9 went down to 5, was noted to have moved all extremities but not to command.   Patient was intubated for several scans patient required, sedation stopped around 4:30 AM after return from MRI did move had some and slight movements in left extremity.  Positive gag and corneals         Fluids    Intake/Output Summary (Last 24 hours) at 3/5/2022 0557  Last data filed at 3/5/2022 0200  Gross per 24 hour   Intake 2625.92 ml   Output --   Net 2625.92 ml       Laboratory  Recent Results (from the past 48 hour(s))   POCT glucose device results    Collection Time: 03/04/22 10:43 PM   Result Value Ref Range    POC Glucose, Blood 193 (H) 65 - 99 mg/dL   LACTIC ACID    Collection Time: 03/04/22 10:47 PM   Result  Value Ref Range    Lactic Acid 2.3 (H) 0.5 - 2.0 mmol/L   CBC WITH DIFFERENTIAL    Collection Time: 03/04/22 10:47 PM   Result Value Ref Range    WBC 14.3 (H) 4.8 - 10.8 K/uL    RBC 3.61 (L) 4.70 - 6.10 M/uL    Hemoglobin 9.9 (L) 14.0 - 18.0 g/dL    Hematocrit 31.7 (L) 42.0 - 52.0 %    MCV 87.8 81.4 - 97.8 fL    MCH 27.4 27.0 - 33.0 pg    MCHC 31.2 (L) 33.7 - 35.3 g/dL    RDW 47.3 35.9 - 50.0 fL    Platelet Count 301 164 - 446 K/uL    MPV 9.4 9.0 - 12.9 fL    Neutrophils-Polys 82.50 (H) 44.00 - 72.00 %    Lymphocytes 8.70 (L) 22.00 - 41.00 %    Monocytes 7.00 0.00 - 13.40 %    Eosinophils 0.90 0.00 - 6.90 %    Basophils 0.30 0.00 - 1.80 %    Immature Granulocytes 0.60 0.00 - 0.90 %    Nucleated RBC 0.00 /100 WBC    Neutrophils (Absolute) 11.80 (H) 1.82 - 7.42 K/uL    Lymphs (Absolute) 1.24 1.00 - 4.80 K/uL    Monos (Absolute) 1.00 (H) 0.00 - 0.85 K/uL    Eos (Absolute) 0.13 0.00 - 0.51 K/uL    Baso (Absolute) 0.05 0.00 - 0.12 K/uL    Immature Granulocytes (abs) 0.08 0.00 - 0.11 K/uL    NRBC (Absolute) 0.00 K/uL   COMP METABOLIC PANEL    Collection Time: 03/04/22 10:47 PM   Result Value Ref Range    Sodium 137 135 - 145 mmol/L    Potassium 4.4 3.6 - 5.5 mmol/L    Chloride 101 96 - 112 mmol/L    Co2 20 20 - 33 mmol/L    Anion Gap 16.0 7.0 - 16.0    Glucose 220 (H) 65 - 99 mg/dL    Bun 25 (H) 8 - 22 mg/dL    Creatinine 1.15 0.50 - 1.40 mg/dL    Calcium 9.3 8.5 - 10.5 mg/dL    AST(SGOT) 17 12 - 45 U/L    ALT(SGPT) 15 2 - 50 U/L    Alkaline Phosphatase 86 30 - 99 U/L    Total Bilirubin 0.4 0.1 - 1.5 mg/dL    Albumin 3.1 (L) 3.2 - 4.9 g/dL    Total Protein 6.6 6.0 - 8.2 g/dL    Globulin 3.5 1.9 - 3.5 g/dL    A-G Ratio 0.9 g/dL   C Reactive Protein Quantitative (Non-Cardiac)    Collection Time: 03/04/22 10:47 PM   Result Value Ref Range    Stat C-Reactive Protein 12.69 (H) 0.00 - 0.75 mg/dL   Sed Rate    Collection Time: 03/04/22 10:47 PM   Result Value Ref Range    Sed Rate Westergren 85 (H) 0 - 20 mm/hour   proBrain  Natriuretic Peptide, NT    Collection Time: 22 10:47 PM   Result Value Ref Range    NT-proBNP 318 (H) 0 - 125 pg/mL   TROPONIN    Collection Time: 22 10:47 PM   Result Value Ref Range    Troponin T 32 (H) 6 - 19 ng/L   PROCALCITONIN    Collection Time: 22 10:47 PM   Result Value Ref Range    Procalcitonin 0.13 <0.25 ng/mL   ESTIMATED GFR    Collection Time: 22 10:47 PM   Result Value Ref Range    GFR If African American >60 >60 mL/min/1.73 m 2    GFR If Non African American >60 >60 mL/min/1.73 m 2   COD (ADULT)    Collection Time: 22 10:47 PM   Result Value Ref Range    ABO Grouping Only O     Rh Grouping Only NEG     Antibody Screen-Cod NEG    EKG    Collection Time: 22 10:58 PM   Result Value Ref Range    Report       [facility]    Test Date:  2022  Pt Name:    ANISA STRINGER               Department: ER  MRN:        [id number]                      Room:       [facility]  Gender:     Male                         Technician: [id number]  :        [id number]                   Requested By:KORTNEY YOO  Order #:    [id number]                    Reading MD: KORTNEY YOO MD    Measurements  Intervals                                Axis  Rate:       89                           P:          74  CA:         227                          QRS:        -15  QRSD:       95                           T:          -11  QT:         348  QTc:        421    Interpretive Statements  Sinus rhythm  Atrial premature complex  Prolonged CA interval  Inferior infarct, old  Consider anterior infarct  No previous ECG available for comparison  Electronically Signed On 3-5-2022 0:16:56 PST by KORTNEY YOO MD     URINALYSIS    Collection Time: 22 11:33 PM    Specimen: Urine   Result Value Ref Range    Color Yellow     Character Clear     Specific Gravity 1.023 <1.035    Ph 6.0 5.0 - 8.0    Glucose Negative Negative mg/dL    Ketones Negative Negative  mg/dL    Protein 30 (A) Negative mg/dL    Bilirubin Negative Negative    Urobilinogen, Urine 1.0 Negative    Nitrite Negative Negative    Leukocyte Esterase Negative Negative    Occult Blood Negative Negative    Micro Urine Req Microscopic    URINE MICROSCOPIC (W/UA)    Collection Time: 03/04/22 11:33 PM   Result Value Ref Range    WBC 0-2 (A) /hpf    RBC 0-2 (A) /hpf    Bacteria Negative None /hpf    Epithelial Cells Negative /hpf    Hyaline Cast 0-2 /lpf   POCT arterial blood gas device results    Collection Time: 03/05/22 12:01 AM   Result Value Ref Range    Ph 7.332 (L) 7.400 - 7.500    Pco2 47.9 (H) 26.0 - 37.0 mmHg    Po2 172 (H) 64 - 87 mmHg    Tco2 27 20 - 33 mmol/L    S02 99 93 - 99 %    Hco3 25.4 (H) 17.0 - 25.0 mmol/L    BE -1 -4 - 3 mmol/L    Body Temp 97.5 F degrees    O2 Therapy 100 %    iPF Ratio 172     Ph Temp Milad 7.341 (L) 7.400 - 7.500    Pco2 Temp Co 46.6 (H) 26.0 - 37.0 mmHg    Po2 Temp Cor 168 (H) 64 - 87 mmHg    Specimen Arterial     DelSys Vent     Tidal Volume 440 mL    Peep End Expiratory Pressure 8 cmh20    Set Rate 26     Mode APV-CMV    POCT sodium device results    Collection Time: 03/05/22 12:01 AM   Result Value Ref Range    Istat Sodium 138 135 - 145 mmol/L   POCT potassium device results    Collection Time: 03/05/22 12:01 AM   Result Value Ref Range    Istat Potassium 4.2 3.6 - 5.5 mmol/L   POCT ionized CA device results    Collection Time: 03/05/22 12:01 AM   Result Value Ref Range    Istat Ionized Calcium 1.32 (H) 1.10 - 1.30 mmol/L   POCT hematocrit and hemoglobin device results    Collection Time: 03/05/22 12:01 AM   Result Value Ref Range    Istat Hematocrit 27 (L) 42 - 52 %    Istat Hemoglobin 9.2 (L) 14.0 - 18.0 g/dL   LACTIC ACID    Collection Time: 03/05/22  1:05 AM   Result Value Ref Range    Lactic Acid 1.2 0.5 - 2.0 mmol/L   Triglycerides Starting now and then Every 3 Days    Collection Time: 03/05/22  1:05 AM   Result Value Ref Range    Triglycerides 63 0 - 149 mg/dL    aPTT    Collection Time: 03/05/22  1:05 AM   Result Value Ref Range    APTT 30.5 24.7 - 36.0 sec   Prothrombin Time    Collection Time: 03/05/22  1:05 AM   Result Value Ref Range    PT 16.2 (H) 12.0 - 14.6 sec    INR 1.35 (H) 0.87 - 1.13   Heparin Xa (Unfractionated)    Collection Time: 03/05/22  1:05 AM   Result Value Ref Range    Heparin Xa (UFH) <0.10 IU/mL   Comp Metabolic Panel    Collection Time: 03/05/22  1:05 AM   Result Value Ref Range    Sodium 135 135 - 145 mmol/L    Potassium 4.5 3.6 - 5.5 mmol/L    Chloride 100 96 - 112 mmol/L    Co2 23 20 - 33 mmol/L    Anion Gap 12.0 7.0 - 16.0    Glucose 217 (H) 65 - 99 mg/dL    Bun 26 (H) 8 - 22 mg/dL    Creatinine 1.11 0.50 - 1.40 mg/dL    Calcium 9.3 8.5 - 10.5 mg/dL    AST(SGOT) 19 12 - 45 U/L    ALT(SGPT) 14 2 - 50 U/L    Alkaline Phosphatase 92 30 - 99 U/L    Total Bilirubin 0.6 0.1 - 1.5 mg/dL    Albumin 3.0 (L) 3.2 - 4.9 g/dL    Total Protein 6.4 6.0 - 8.2 g/dL    Globulin 3.4 1.9 - 3.5 g/dL    A-G Ratio 0.9 g/dL   ESTIMATED GFR    Collection Time: 03/05/22  1:05 AM   Result Value Ref Range    GFR If African American >60 >60 mL/min/1.73 m 2    GFR If Non African American >60 >60 mL/min/1.73 m 2   CoV-2, FLU A/B, and RSV by PCR (2-4 Hours CEPHEID) : Collect NP swab in VT    Collection Time: 03/05/22  1:15 AM    Specimen: Nasopharyngeal; Respirate   Result Value Ref Range    Influenza virus A RNA Negative Negative    Influenza virus B, PCR Negative Negative    RSV, PCR Negative Negative    SARS-CoV-2 by PCR NotDetected     SARS-CoV-2 Source NP Swab        Imaging  MR-LUMBAR SPINE-WITH & W/O   Final Result      1.  Abnormal T2 fluid signal intensity in the L1, L2 and to a lesser extent the L5-S1 vertebral disc spaces. There is no definite evidence of adjacent marrow edema signal or enhancement in the opposing endplates. Early discitis at these levels cannot be    excluded. Short interval imaging follow-up is recommended as clinically appropriate.   2.  There  is diffuse enhancement in the surgical bed extending to the level of the dorsal thecal sac. There is no definite evidence of epidural fluid collection or abscess. Evaluation of the postoperative levels is somewhat limited by hardware magnetic    susceptibility artifact.   3.  Postsurgical changes of the lumbar spine with laminectomy defects extending from L2 to S1 right posterior hemifusion hardware at L4-5 and lateral fusion hardware L4-5. A disc spacer device appears present at L4-5.   4.  Multilevel degenerative changes of the lumbar spine as described above.      MR-BRAIN-WITH & W/O   Final Result      1.  Moderate to large sized acute to subacute infarcts in the left anterior cerebral artery territory, left parietal, right parietal, bilateral occipital lobes and right cerebellum. Patchy areas of enhancement are noted in the left anterior cerebral    artery territory and right cerebellum which is more consistent with subacute chronicity. There is no evidence of hemorrhagic transformation.   2.  Chronic right frontal lobe and bilateral cerebellar infarcts.   3.  Mild diffuse cerebral substance loss.   4.  Moderate microangiopathic ischemic change versus demyelination or gliosis.      CT-CTA NECK WITH & W/O-POST PROCESSING   Final Result      CT angiogram of the neck within normal limits.      CT-CTA HEAD WITH & W/O-POST PROCESS   Final Result      CT angiogram of the [place] of [place] within normal limits.      CT-CEREBRAL PERFUSION ANALYSIS   Final Result      1.  Cerebral blood flow less than 30% likely representing completed infarct = 0 mL.      2.  T Max more than 6 seconds likely representing combination of completed infarct and ischemia = 27 mL.      3.  Mismatched volume likely representing ischemic brain/penumbra = 27      4.  Please note that the cerebral perfusion was performed on the limited brain tissue around the basal ganglia region. Infarct/ischemia outside the CT perfusion sections can be missed in  this study.      CT-ABDOMEN-PELVIS WITH   Final Result      1.  Several limited areas of decreased enhancement in the spleen are present consistent with splenic infarcts.      2.  Renal infarcts also appear to be present in the parenchyma of the left kidney.      3.  Tubular filling defect consistent with thrombus is identified in the aorta as described above.      4.  Some wall thickening appears to be present in the cecum which is not fully distended. This could be due to lack of distention or spasm although colitis is also possible.      5.  Consolidation and volume loss in the left lung base.      CT-HEAD W/O   Final Result         1.  Brain swelling and edema and right cerebellar hemisphere is noted which could indicate subacute infarct.      2.  Evidence of previous right frontal lobe infarct with encephalomalacia.      3.  No intracranial hemorrhage.      Decreased attenuation in the cerebral white matter likely indicates microvascular ischemic disease.         CT-CTA CHEST PULMONARY ARTERY W/ RECONS   Final Result      1.  Exam is positive for significant pulmonary emboli bilaterally.      2.  Abnormally elevated RV LV ratio is present.      3.  Consolidation and volume loss present in much of the left lower lobe could be due to pneumonia. Pulmonary infarct is also possible.      These findings were discussed with BON FALCON on 03/05/2022. Abdominal CT results also discussed..      DX-CHEST-PORTABLE (1 VIEW)   Final Result         1.  Right central line noted with tip at the level of the SVC. No pneumothorax identified. Endotracheal tube is noted in place.      DX-CHEST-PORTABLE (1 VIEW)   Final Result         1.  Elevation of left hemidiaphragm and probable atelectasis in the left lung base. Developing pneumonitis or pneumonia is also possible.      2.  No other infiltrates or consolidations identified.      US-EXTREMITY VENOUS LOWER BILAT    (Results Pending)   US-EXTREMITY VENOUS UPPER UNILAT LEFT     (Results Pending)   EC-ECHOCARDIOGRAM COMPLETE W/O CONT    (Results Pending)   US-EXTREMITY ARTERY LOWER BILAT    (Results Pending)   US-EXTREMITY ARTERY UPPER UNILAT LEFT    (Results Pending)       Assessment/Plan  * Septic embolism (HCC)  Assessment & Plan  Patient with clots noted in both venous (both PE, and stroke), also with arterial thrombus in aorta and renal and splenic infarcts  With history of continued MSSA bacteremia post surgery with hardware infection very likely patient still has staph bacteremia which has led to septic emboli and infarcts  On appropriate antibiotics  Follow-up blood cultures  Likely Will need an ID consult in a.m.  Anticoagulation recommended for aortic thrombus and PE, however with multiple septic emboli strokes of different ages high risk for hemorrhagic conversion  Follow-up brain and lumbar MRI  TTE with bubble study ordered(as clots on both side of the heart) possible PFO, also to evaluate for endocarditis, patient will very likely need SALVATORE later in hospitalization.       Encephalopathy  Assessment & Plan  Very likely multifactorial with septic emboli throughout body, septic shock, PE with obstructive shock, and multiple strokes possible acute stroke in right cerebellum  CT head, perfusion, and CTA done see stroke assessment above  Awaiting MRI brain for further details  Patient was obtunded and not making purposeful movements in ED, was paralyzed and intubated, and patient has been in radiology receiving multiple scans since requiring sedation to allow clear images  We will decrease sedation to get full neuro exam as he returns  Initial CT was read as cerebral edema and cerebellum-discussed with neurology who reviewed imaging does not believe significant edema and keeping patient you need treatment is appropriate no need for hypernatremia.      S/P lumbar fusion  Assessment & Plan  Lumbar fusion for lumbar stenosis with neurologic claudication in November 2021 with multiple  complications as above and below      Wound infection complicating hardware, sequela  Assessment & Plan  Patient had lumbar fusion in November 2021, this was complicated in December by a MSSA paraspinal infection likely including hardware , requiring wound washout and long course of antibiotics including rifampin at home  Patient had recurrent bacteremia in January-however could not find evidence that lumbar spine was evaluated at that time again.  Per wife patient has pain intermittently at site, but in the last few days has had increasing pain and some swelling around the site  MRI of lumbar spine ordered  We will discuss with spine surgery when results are available  On Vanco and cefepime    Cerebrovascular accident (CVA) due to embolism (HCC)  Assessment & Plan  Patient presented with altered mentation, and became obtunded  CTH was reviewed. It shows scattered bilateral hypodensities that appear subacute to chronic in nature, most notable in the R frontal lobe. There is a more subtle area of hypodensity in the R cerebellum, which could indicate a more acute infarct-with this finding stroke alert was called and patient was taken to CTA/CT perfusion  CTP shows scattered small cerebellar and L occipital perfusion deficits that may represent movement artifact or small cortical strokes, CTA without large thrombus found no intervention  Long discussion with neurology on patient's complex history, including PE and aortic thrombus likely needing anticoagulation, high risk for head bleed in patient with multiple septic emboli of varying ages.   Neurology recommending MRI head, if no signs of hemorrhagic conversion and need for anticoagulation for systemic clots can consider heparin without bolus, but will need to monitor neuro exam closely  Initial CT was read as cerebral edema and cerebellum-discussed with neurology who reviewed imaging does not believe significant edema and keeping patient you need treatment is  appropriate no need for hypernatremia.  Will decrease amount of sedation after MRI complete to be able to obtain better neurologic exam    Renal infarct (HCC)  Assessment & Plan  Likely due to septic emboli possibly from aortic thrombus  Monitor renal function    Splenic infarct  Assessment & Plan  Multiple embolic infarcts noted, likely secondary to aortic thrombus/septic emboli    Aortic thrombus (HCC)  Assessment & Plan  Aortic thrombus 1.2 cm diameter-6 cm length and abdominal aorta above the level of renal arteries  With patient's history of septic emboli and infection, likely this is a septic thrombus  Complicated by renal and splenic infarcts  Discussed with vascular surgery Dr. Bai, no acute surgery needs, will need to evaluate patient's status from PE and stroke and determine if aortic surgery is indicated, will see in the a.m.  Was also discussed with IR, who stated possible they could help with intervention however need vascular evaluation prior    Pulmonary embolism (HCC)  Assessment & Plan  Exam is positive for significant pulmonary emboli bilaterally, with possible pulmonary infarct of left lower lobe  Heparin drip initiated however with new stroke held until MRI  Lower extremity Dopplers ordered, if unable to have full anticoagulation likely will need IVC filter present (left leg had been having increased swelling over last few days)  Patient also with upper arm swelling and weakness will obtain upper arm ultrasounds  Very likely in setting this is septic emboli, with recent strokes and neurosurgery patient is not a TPA candidate  After fluids patient's hemodynamics improved significantly, however still requiring low-dose nor epi (somewhat to help maintain systolic pressure Above 110 as needed for stroke)  Even catheter directed TPA would be high risk with multiple strokes, however if continuing to worsen hemodynamically this would be much preferred to systemic TPA  Discussed in depth with IR  physician-as patient's hemodynamics have stabilized, and per his read right heart not significantly dilated (per ER physician did not appear significantly dilated on his bedside ultrasound either), very mild troponin and BNP elevation-does not feel emergent thrombectomy is needed though would be possible and if patient's hemodynamics worsened would recommend   We will monitor closely, as patient with multiple PEs may throw another and worsen acutely    Septic shock (HCC)  Assessment & Plan  This is Septic shock Present on admission  SIRS criteria identified on my evaluation include: Tachycardia, with heart rate greater than 90 BPM, Tachypnea, with respirations greater than 20 per minute and Leukocytosis, with WBC greater than 12,000  Source is likely staph bacteremia with septic emboli  Presentation includes: Severe sepsis present, persistent hypotension after 30 ml/kg completed, and initial lactate level result is >= 4 mmol/L.   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   Now presenting with septic shock and hypotension (though likely multifactorial as PE is present also      Acute respiratory failure with hypoxia (HCC)  Assessment & Plan  Patient became obtunded in ER unresponsive extremely tachypneic and unable to maintain airway  Was intubated  Ventilator order set  Monitor ABGs  Sedation with Precedex and fentanyl-we will decrease as tolerated to evaluate patient's mental status  SAT SBT  daily  Respiratory failure likely secondary to PE, sepsis, and altered mentation secondary to possible stroke        The patient remains critically ill.  Critical care time = 180 coordinating critical care and extensive data review.  No time overlap and excludes procedures.

## 2024-11-04 ENCOUNTER — HOSPITAL ENCOUNTER (OUTPATIENT)
Dept: RADIOLOGY | Facility: HOSPITAL | Age: 59
Discharge: HOME | End: 2024-11-04
Payer: COMMERCIAL

## 2024-11-04 ENCOUNTER — HOSPITAL ENCOUNTER (OUTPATIENT)
Dept: RADIOLOGY | Facility: HOSPITAL | Age: 59
Discharge: HOME | End: 2024-11-04
Attending: FAMILY MEDICINE
Payer: COMMERCIAL

## 2024-11-04 VITALS — SYSTOLIC BLOOD PRESSURE: 110 MMHG | DIASTOLIC BLOOD PRESSURE: 60 MMHG

## 2024-11-04 DIAGNOSIS — R92.8 ABNORMAL MAMMOGRAM OF LEFT BREAST: ICD-10-CM

## 2024-11-04 DIAGNOSIS — K21.9 GASTROESOPHAGEAL REFLUX DISEASE WITHOUT ESOPHAGITIS: ICD-10-CM

## 2024-11-04 PROCEDURE — 88305 TISSUE EXAM BY PATHOLOGIST: CPT | Performed by: PATHOLOGY

## 2024-11-04 PROCEDURE — A4648 IMPLANTABLE TISSUE MARKER: HCPCS

## 2024-11-04 PROCEDURE — 19083 BX BREAST 1ST LESION US IMAG: CPT

## 2024-11-04 RX ORDER — LIDOCAINE HYDROCHLORIDE 10 MG/ML
5 INJECTION, SOLUTION EPIDURAL; INFILTRATION; INTRACAUDAL; PERINEURAL ONCE
Status: COMPLETED | OUTPATIENT
Start: 2024-11-04 | End: 2024-11-04

## 2024-11-04 RX ADMIN — LIDOCAINE HYDROCHLORIDE 5 ML: 10 INJECTION, SOLUTION EPIDURAL; INFILTRATION; INTRACAUDAL; PERINEURAL at 13:24

## 2024-11-04 NOTE — PROGRESS NOTES
Procedure type:    __x___ultrasound guided _____stereotactic    Breast:    ___x__Left _____Right    Location: 12:00 Periareolar    Needle: 12G    # of passes: 3    Clip: Open coil    Performed by: Dr Romero    Pressure held for 5 minutes by: Jalyn Espinoza Strips:    ___x__yes _____no    Band aid:    _____yes___x__no    Tape and guaze:    __x___yes _____no    Tolerated procedure:    ___x__yes _____no

## 2024-11-04 NOTE — DISCHARGE INSTR - OTHER ORDERS
POST LARGE CORE BREAST BIOPSY PATIENT INFORMATION      Place an ice pack inside your bra over the top of the gauze dressing/ band-aid every hour for 20 minutes (20 minutes on, 60 minutes off). Do this until bedtime.    Do not shower or bathe until the following day Tuesday 11/5/24 @ 2PM.     You may bathe your breast carefully with the steri-strips in place.  Be careful not to loosen them. The steri-strips are recommended to stay in place for 3-5 days to allow your body adequate time to heal the beast biopsy incision.    You may have mild discomfort, and you may have some bruising where the needle entered the skin. Bruising can be very common following a breast biopsy procedure, and will typically clear in 1-2 weeks time without any additional intervention.    If you need medicine for discomfort, take acetaminophen products such as Tylenol. You may also take Advil or Motrin products. Avoid using Aleve/ or Aspirin-based medications for pain relief as these may potentially increase the risk for bruising, bleeding, or hematoma development post-procedure.    Do not participate in strenuous activities such as-tennis, aerobics, skiing, weight lifting > 10 lbs, etc. for 24 hours. Refrain from swimming/soaking until biopsy incision is fully healed to reduce the risk for infection/ bacteria entry.    Wearing a bra for sleeping may be more comfortable for the first 24-48 hours.    Watch for continued bleeding, pain or fever over 101. If any of these symptoms occur, please contact our breast nurse navigator at the location where your biopsy was performed.    During normal business hours (7:30 am-4:00 pm) please call the nurse navigator at the site where your   procedure was performed:    [facility]:  [phone], [phone] or [phone]  [facility]:     [phone] or [phone]  [facility]:    [phone] or [phone]  [facility]  [place]:   [phone] or [phone]  [facility]:   [phone]  [facility]:   Wanda Boateng Radiology RN/ Jalyn Sheridan [facility]  P# [phone]/ P# [phone]              After 4 PM - please call your physician or go to the nearest Emergency Department location.            9.         The final results of your biopsy are usually available within one week.

## 2024-11-05 ENCOUNTER — TELEPHONE (OUTPATIENT)
Dept: RADIOLOGY | Facility: HOSPITAL | Age: 59
End: 2024-11-05

## 2024-11-05 PROCEDURE — 88305 TISSUE EXAM BY PATHOLOGIST: CPT | Performed by: PATHOLOGY

## 2024-11-05 RX ORDER — PANTOPRAZOLE SODIUM 40 MG/1
40 TABLET, DELAYED RELEASE ORAL DAILY
Qty: 100 TABLET | Refills: 1 | Status: SHIPPED | OUTPATIENT
Start: 2024-11-05

## 2024-11-05 NOTE — PROGRESS NOTES
Post-procedure call completed: Tuesday 11/05/2024    Bleeding: _____ yes __x__ no    Pain: _____yes ___x___ no    *Mirta reports some breast soreness, but no significant pain post-procedure. She reports using the ice pack Monday afternoon & evening as instructed to help with pain and risk reduction of post-procedural bruising/bleeding/ or hematoma development. She is aware she may use OTC pain control medications on an as needed basis over the remainder of the week as her breast is continuing to heal post-procedure (tylenol, motrin, ibuprofen, advil all ok to use; avoid aspirin based products due to bleeding risk).    Redness/Swelling: ______ yes ___x___ no    Band aid removed: _____ yes ___x__no    *Mirta is aware she may remove gauze dressing & and tape today. She may shower with steri-strips in place as they are water resistant, but avoid scrubbing directly near biopsy incision site so that she may avoid loosening steri-strips. She is aware to avoid prolonged soaking to the breast, hot tubs, or tub baths until biopsy incision is fully healed to reduce the risk for infection.    Steri-Strips intact: ___x___ yes _____ no    *Patient instructed to leave steri-strips in place until Friday 11/08/24. If steri-strips do not fall off on their own at that time, it would be appropriate to remove.    No additional questions noted at this time. I relayed to Mirta that either myself or Dr Romero will call her directly when breast biopsy pathology results are available. Mirta does have my name & office phone number printed in her Discharge Information packet from Monday 11/04/24 if she has any questions or concerns in the interim of time until her pathology results are finalized.

## 2024-11-06 ENCOUNTER — TELEPHONE (OUTPATIENT)
Dept: RADIOLOGY | Facility: HOSPITAL | Age: 59
End: 2024-11-06

## 2024-11-07 ENCOUNTER — TELEPHONE (OUTPATIENT)
Dept: RADIOLOGY | Facility: HOSPITAL | Age: 59
End: 2024-11-07

## 2024-11-07 ENCOUNTER — TELEPHONE (OUTPATIENT)
Dept: FAMILY MEDICINE CLINIC | Facility: CLINIC | Age: 59
End: 2024-11-07

## 2024-11-07 NOTE — TELEPHONE ENCOUNTER
Called pt to discuss results of the breast biopsy  - left message for pt to call back.  Looks like the biopsy ios benign.  She may have this info already  Pt can go back to routine screening

## 2024-12-10 ENCOUNTER — OFFICE VISIT (OUTPATIENT)
Dept: INTERNAL MEDICINE CLINIC | Facility: CLINIC | Age: 59
End: 2024-12-10
Payer: COMMERCIAL

## 2024-12-10 VITALS
SYSTOLIC BLOOD PRESSURE: 106 MMHG | RESPIRATION RATE: 19 BRPM | HEART RATE: 91 BPM | HEIGHT: 62 IN | WEIGHT: 168 LBS | OXYGEN SATURATION: 97 % | BODY MASS INDEX: 30.91 KG/M2 | DIASTOLIC BLOOD PRESSURE: 68 MMHG | TEMPERATURE: 97.8 F

## 2024-12-10 DIAGNOSIS — J01.91 ACUTE RECURRENT SINUSITIS, UNSPECIFIED LOCATION: ICD-10-CM

## 2024-12-10 DIAGNOSIS — J06.9 URI WITH COUGH AND CONGESTION: Primary | ICD-10-CM

## 2024-12-10 DIAGNOSIS — R05.1 ACUTE COUGH: ICD-10-CM

## 2024-12-10 DIAGNOSIS — J44.1 COPD WITH ACUTE EXACERBATION (HCC): ICD-10-CM

## 2024-12-10 LAB
SARS-COV-2 AG UPPER RESP QL IA: NEGATIVE
SL AMB POCT RAPID FLU A: NORMAL
SL AMB POCT RAPID FLU B: NORMAL
VALID CONTROL: NORMAL

## 2024-12-10 PROCEDURE — 87811 SARS-COV-2 COVID19 W/OPTIC: CPT

## 2024-12-10 PROCEDURE — 87804 INFLUENZA ASSAY W/OPTIC: CPT

## 2024-12-10 PROCEDURE — 99214 OFFICE O/P EST MOD 30 MIN: CPT

## 2024-12-10 RX ORDER — AZITHROMYCIN 250 MG/1
TABLET, FILM COATED ORAL
Qty: 6 TABLET | Refills: 0 | Status: SHIPPED | OUTPATIENT
Start: 2024-12-10 | End: 2024-12-15

## 2024-12-10 RX ORDER — IPRATROPIUM BROMIDE AND ALBUTEROL SULFATE 2.5; .5 MG/3ML; MG/3ML
SOLUTION RESPIRATORY (INHALATION)
COMMUNITY

## 2024-12-10 RX ORDER — METHYLPREDNISOLONE 4 MG/1
TABLET ORAL
Qty: 21 EACH | Refills: 0 | Status: SHIPPED | OUTPATIENT
Start: 2024-12-10

## 2024-12-10 RX ORDER — IPRATROPIUM BROMIDE AND ALBUTEROL SULFATE 2.5; .5 MG/3ML; MG/3ML
3 SOLUTION RESPIRATORY (INHALATION) 4 TIMES DAILY
Qty: 360 ML | Refills: 0 | Status: SHIPPED | OUTPATIENT
Start: 2024-12-10

## 2024-12-10 NOTE — PROGRESS NOTES
Name: Mirta Black      : 1965      MRN: [id number]  Encounter Provider: Ana Morataya MD  Encounter Date: 12/10/2024   Encounter department: [facility] INTERNAL MEDICINE  :  Assessment & Plan  URI with cough and congestion  Covid/flu rapid tests negative  Recommend Z-Rom   Recommend steroid taper  Continue maintenance inhaler and DuoNeb nebulizer treatments  Continue other supportive measures  If symptoms persist or worsen, consider obtaining CXR and follow-up evaluation    Orders:    azithromycin (Zithromax) 250 mg tablet; Take 2 tablets (500 mg total) by mouth daily for 1 day, THEN 1 tablet (250 mg total) daily for 4 days.    methylPREDNISolone 4 MG tablet therapy pack; Use as directed on package    Acute recurrent sinusitis, unspecified location  Plan noted above       COPD with acute exacerbation (HCC)  Plan noted above  Orders:    azithromycin (Zithromax) 250 mg tablet; Take 2 tablets (500 mg total) by mouth daily for 1 day, THEN 1 tablet (250 mg total) daily for 4 days.    methylPREDNISolone 4 MG tablet therapy pack; Use as directed on package    ipratropium-albuterol (DUO-NEB) 0.5-2.5 mg/3 mL nebulizer solution; Take 3 mL by nebulization 4 (four) times a day    Acute cough  Plan noted above  Orders:    POCT Rapid Covid Ag    POCT rapid flu A and B    azithromycin (Zithromax) 250 mg tablet; Take 2 tablets (500 mg total) by mouth daily for 1 day, THEN 1 tablet (250 mg total) daily for 4 days.    methylPREDNISolone 4 MG tablet therapy pack; Use as directed on package    Return if symptoms worsen or fail to improve.\     History of Present Illness     HPI  Patient presents with complaint of urinary symptoms x 1 week.  Symptoms include productive cough with thick phlegm, body aches, runny nose, wheezing coughing body aches runny nose wheezing, SOB with exertion, chest tightness and muscle aches from coughing.  History of mild COPD/emphysema and has been using DuoNeb nebulizer and  "maintenance pump, however DuoNeb solution is .  She has also been using Sudafed, Allegra and over-the-counter supportive care measures with limited improvement of her symptoms.  Patient works from home with limited exposure to other sick contacts.    Review of Systems   Constitutional:  Negative for chills and fever.   HENT:  Positive for congestion, postnasal drip, rhinorrhea, sinus pressure and sinus pain.    Eyes:  Negative for visual disturbance.   Respiratory:  Positive for cough, chest tightness, shortness of breath (w/ exertion) and wheezing.    Cardiovascular:  Negative for chest pain and palpitations.   Gastrointestinal:  Negative for abdominal pain, nausea and vomiting.   Genitourinary:  Negative for dysuria.   Musculoskeletal:  Positive for myalgias.   Neurological:  Positive for headaches. Negative for dizziness, weakness and light-headedness.   Psychiatric/Behavioral:  Negative for confusion.        Objective   /68   Pulse 91   Temp 97.8 °F (36.6 °C)   Resp 19   Ht 5' 2\" (1.575 m)   Wt 76.2 kg (168 lb)   SpO2 97%   BMI 30.73 kg/m²      Physical Exam  Vitals and nursing note reviewed.   HENT:      Head: Atraumatic.      Nose: Congestion present.   Eyes:      General:         Right eye: No discharge.         Left eye: No discharge.      Extraocular Movements: Extraocular movements intact.      Conjunctiva/sclera: Conjunctivae normal.   Cardiovascular:      Rate and Rhythm: Normal rate and regular rhythm.      Pulses: Normal pulses.      Heart sounds: Normal heart sounds.   Pulmonary:      Effort: Pulmonary effort is normal. No respiratory distress.      Breath sounds: Wheezing present. No rhonchi or rales.   Musculoskeletal:         General: Normal range of motion.      Cervical back: Normal range of motion.   Skin:     General: Skin is warm and dry.   Neurological:      Mental Status: She is oriented to person, place, and time.   Psychiatric:         Mood and Affect: Mood normal.    "      Behavior: Behavior normal.

## 2024-12-10 NOTE — ASSESSMENT & PLAN NOTE
Orders:    azithromycin (Zithromax) 250 mg tablet; Take 2 tablets (500 mg total) by mouth daily for 1 day, THEN 1 tablet (250 mg total) daily for 4 days.    methylPREDNISolone 4 MG tablet therapy pack; Use as directed on package

## 2024-12-10 NOTE — LETTER
December 10, 2024     Patient: Mirta Black  YOB: 1965  Date of Visit: 12/10/2024      To Whom it May Concern:    Mirta Black is under my professional care. Mirta was seen in my office on 12/10/2024. Please excuse Mirta from work on 12/10 - 12/11. She may return to work on 12/12/24 as long as symptoms are improving .    If you have any questions or concerns, please don't hesitate to call.         Sincerely,          Ana Morataya MD        CC: No Recipients

## 2025-02-11 ENCOUNTER — HOSPITAL ENCOUNTER (EMERGENCY)
Facility: HOSPITAL | Age: 60
Discharge: HOME | End: 2025-02-11
Attending: EMERGENCY MEDICINE | Admitting: EMERGENCY MEDICINE
Payer: COMMERCIAL

## 2025-02-11 ENCOUNTER — APPOINTMENT (EMERGENCY)
Dept: RADIOLOGY | Facility: HOSPITAL | Age: 60
End: 2025-02-11
Payer: COMMERCIAL

## 2025-02-11 VITALS
HEART RATE: 100 BPM | RESPIRATION RATE: 24 BRPM | SYSTOLIC BLOOD PRESSURE: 124 MMHG | OXYGEN SATURATION: 96 % | DIASTOLIC BLOOD PRESSURE: 58 MMHG | WEIGHT: 167 LBS | BODY MASS INDEX: 30.54 KG/M2 | TEMPERATURE: 99.5 F

## 2025-02-11 DIAGNOSIS — J44.1 COPD EXACERBATION (HCC): Primary | ICD-10-CM

## 2025-02-11 DIAGNOSIS — J40 BRONCHITIS: ICD-10-CM

## 2025-02-11 LAB
ALBUMIN SERPL BCG-MCNC: 3.8 G/DL (ref 3.5–5)
ALP SERPL-CCNC: 74 U/L (ref 34–104)
ALT SERPL W P-5'-P-CCNC: 17 U/L (ref 7–52)
ANION GAP SERPL CALCULATED.3IONS-SCNC: 10 MMOL/L (ref 4–13)
AST SERPL W P-5'-P-CCNC: 14 U/L (ref 13–39)
BASOPHILS # BLD AUTO: 0.05 THOUSANDS/ΜL (ref 0–0.1)
BASOPHILS NFR BLD AUTO: 1 % (ref 0–1)
BILIRUB SERPL-MCNC: 0.26 MG/DL (ref 0.2–1)
BUN SERPL-MCNC: 10 MG/DL (ref 5–25)
CALCIUM SERPL-MCNC: 9.5 MG/DL (ref 8.4–10.2)
CHLORIDE SERPL-SCNC: 103 MMOL/L (ref 96–108)
CO2 SERPL-SCNC: 23 MMOL/L (ref 21–32)
CREAT SERPL-MCNC: 0.72 MG/DL (ref 0.6–1.3)
EOSINOPHIL # BLD AUTO: 0.07 THOUSAND/ΜL (ref 0–0.61)
EOSINOPHIL NFR BLD AUTO: 1 % (ref 0–6)
ERYTHROCYTE [DISTWIDTH] IN BLOOD BY AUTOMATED COUNT: 15.2 % (ref 11.6–15.1)
FLUAV AG UPPER RESP QL IA.RAPID: NEGATIVE
FLUBV AG UPPER RESP QL IA.RAPID: NEGATIVE
GFR SERPL CREATININE-BSD FRML MDRD: 91 ML/MIN/1.73SQ M
GLUCOSE SERPL-MCNC: 109 MG/DL (ref 65–140)
HCT VFR BLD AUTO: 43.7 % (ref 34.8–46.1)
HGB BLD-MCNC: 14.3 G/DL (ref 11.5–15.4)
IMM GRANULOCYTES # BLD AUTO: 0.04 THOUSAND/UL (ref 0–0.2)
IMM GRANULOCYTES NFR BLD AUTO: 1 % (ref 0–2)
LYMPHOCYTES # BLD AUTO: 1.3 THOUSANDS/ΜL (ref 0.6–4.47)
LYMPHOCYTES NFR BLD AUTO: 17 % (ref 14–44)
MCH RBC QN AUTO: 29.4 PG (ref 26.8–34.3)
MCHC RBC AUTO-ENTMCNC: 32.7 G/DL (ref 31.4–37.4)
MCV RBC AUTO: 90 FL (ref 82–98)
MONOCYTES # BLD AUTO: 1.16 THOUSAND/ΜL (ref 0.17–1.22)
MONOCYTES NFR BLD AUTO: 15 % (ref 4–12)
NEUTROPHILS # BLD AUTO: 4.9 THOUSANDS/ΜL (ref 1.85–7.62)
NEUTS SEG NFR BLD AUTO: 65 % (ref 43–75)
NRBC BLD AUTO-RTO: 0 /100 WBCS
PLATELET # BLD AUTO: 193 THOUSANDS/UL (ref 149–390)
PMV BLD AUTO: 11.7 FL (ref 8.9–12.7)
POTASSIUM SERPL-SCNC: 3.7 MMOL/L (ref 3.5–5.3)
PROCALCITONIN SERPL-MCNC: <0.05 NG/ML
PROT SERPL-MCNC: 6.9 G/DL (ref 6.4–8.4)
RBC # BLD AUTO: 4.87 MILLION/UL (ref 3.81–5.12)
SARS-COV+SARS-COV-2 AG RESP QL IA.RAPID: NEGATIVE
SODIUM SERPL-SCNC: 136 MMOL/L (ref 135–147)
WBC # BLD AUTO: 7.52 THOUSAND/UL (ref 4.31–10.16)

## 2025-02-11 PROCEDURE — 71045 X-RAY EXAM CHEST 1 VIEW: CPT

## 2025-02-11 PROCEDURE — 94640 AIRWAY INHALATION TREATMENT: CPT

## 2025-02-11 PROCEDURE — 36415 COLL VENOUS BLD VENIPUNCTURE: CPT | Performed by: EMERGENCY MEDICINE

## 2025-02-11 PROCEDURE — 99284 EMERGENCY DEPT VISIT MOD MDM: CPT

## 2025-02-11 PROCEDURE — 87811 SARS-COV-2 COVID19 W/OPTIC: CPT | Performed by: EMERGENCY MEDICINE

## 2025-02-11 PROCEDURE — 84145 PROCALCITONIN (PCT): CPT | Performed by: EMERGENCY MEDICINE

## 2025-02-11 PROCEDURE — 80053 COMPREHEN METABOLIC PANEL: CPT | Performed by: EMERGENCY MEDICINE

## 2025-02-11 PROCEDURE — 87804 INFLUENZA ASSAY W/OPTIC: CPT | Performed by: EMERGENCY MEDICINE

## 2025-02-11 PROCEDURE — 99285 EMERGENCY DEPT VISIT HI MDM: CPT | Performed by: EMERGENCY MEDICINE

## 2025-02-11 PROCEDURE — 85025 COMPLETE CBC W/AUTO DIFF WBC: CPT | Performed by: EMERGENCY MEDICINE

## 2025-02-11 RX ORDER — PREDNISONE 20 MG/1
40 TABLET ORAL ONCE
Status: COMPLETED | OUTPATIENT
Start: 2025-02-11 | End: 2025-02-11

## 2025-02-11 RX ORDER — IPRATROPIUM BROMIDE AND ALBUTEROL SULFATE 2.5; .5 MG/3ML; MG/3ML
3 SOLUTION RESPIRATORY (INHALATION) ONCE
Status: COMPLETED | OUTPATIENT
Start: 2025-02-11 | End: 2025-02-11

## 2025-02-11 RX ORDER — PREDNISONE 20 MG/1
40 TABLET ORAL DAILY
Qty: 14 TABLET | Refills: 0 | Status: SHIPPED | OUTPATIENT
Start: 2025-02-11 | End: 2025-02-18

## 2025-02-11 RX ADMIN — IPRATROPIUM BROMIDE AND ALBUTEROL SULFATE 3 ML: .5; 3 SOLUTION RESPIRATORY (INHALATION) at 14:13

## 2025-02-11 RX ADMIN — PREDNISONE 40 MG: 20 TABLET ORAL at 15:04

## 2025-02-11 RX ADMIN — IPRATROPIUM BROMIDE AND ALBUTEROL SULFATE 3 ML: .5; 3 SOLUTION RESPIRATORY (INHALATION) at 15:04

## 2025-02-11 NOTE — DISCHARGE INSTRUCTIONS
Use the prescribed prednisone for possible COPD exacerbation.  If you have any severe worsening shortness of breath, worsening productive cough, shaking chills, return to the emergency department.  Follow-up with your primary care provider in the next 1 to 2 weeks for reassessment.

## 2025-02-11 NOTE — ED PROVIDER NOTES
Time reflects when diagnosis was documented in both MDM as applicable and the Disposition within this note       Time User Action Codes Description Comment    2/11/2025  3:03 PM Jonh Carranza Add [J44.1] COPD exacerbation (HCC)     2/11/2025  3:03 PM Jonh Carranza Add [J40] Bronchitis           ED Disposition       ED Disposition   Discharge    Condition   Stable    Date/Time   Tue Feb 11, 2025  3:03 PM    Comment   Mirta Black discharge to home/self care.                   Assessment & Plan       Medical Decision Making  Differential diagnosis includes but is not limited to COPD exacerbation, bronchitis, pneumonia, pneumothorax, pleural effusion.  I ordered and reviewed lab work including CBC, CMP, procalcitonin, COVID/flu swab which were all grossly unremarkable.  I ordered and independently interpreted a chest x-ray which did not demonstrate acute cardiopulmonary abnormality.  I treated patient with DuoNeb x 2, steroids.  Patient stating improvement of symptoms following DuoNebs.  Provided with prescription for steroids, reassurance, discharged with return precautions.    Amount and/or Complexity of Data Reviewed  Labs: ordered.  Radiology: ordered and independent interpretation performed.    Risk  Prescription drug management.             Medications   ipratropium-albuterol (DUO-NEB) 0.5-2.5 mg/3 mL inhalation solution 3 mL (3 mL Nebulization Given 2/11/25 1413)   ipratropium-albuterol (DUO-NEB) 0.5-2.5 mg/3 mL inhalation solution 3 mL (3 mL Nebulization Given 2/11/25 1504)   predniSONE tablet 40 mg (40 mg Oral Given 2/11/25 1504)       ED Risk Strat Scores                          SBIRT 20yo+      Flowsheet Row Most Recent Value   Initial Alcohol Screen: US AUDIT-C     1. How often do you have a drink containing alcohol? 0 Filed at: 02/11/2025 1406   2. How many drinks containing alcohol do you have on a typical day you are drinking?  0 Filed at: 02/11/2025 1401   3a. Male UNDER 65: How often do  you have five or more drinks on one occasion? 0 Filed at: 2025 1407   3b. FEMALE Any Age, or MALE 65+: How often do you have 4 or more drinks on one occassion? 0 Filed at: 2025 1402   Audit-C Score 0 Filed at: 2025 1408   PATRIC: How many times in the past year have you...    Used an illegal drug or used a prescription medication for non-medical reasons? Never Filed at: 2025 1404                            History of Present Illness       Chief Complaint   Patient presents with    Cough     States hx of emphysema, started 2 days ago with productive cough, feels SOB and temp was 99.9       Past Medical History:   Diagnosis Date    Abnormal Pap smear of cervix     ASCUS 10 yrs ago    Allergic rhinitis     Resolved 2017     BRCA1 negative     Chronic sinusitis     Resolved 2017     COPD (chronic obstructive pulmonary disease) (HCC) 2000    Dx by [facility] due to history of smoking    COVID-19 01/10/2022    Emphysema lung (HCC)     Empyema (HCC)     Genital warts     years ago    GERD (gastroesophageal reflux disease)     Mitral valve prolapse     Palpitations     Last assessed 2006     Skin disorder     Resolved 2017     Skin lesions     Resolved 2017       Past Surgical History:   Procedure Laterality Date    BREAST BIOPSY Bilateral     beningn    BREAST BIOPSY Left 2024    ENDOMETRIAL ABLATION      LIPOMA RESECTION      arm    TUBAL LIGATION      US GUIDED BREAST BIOPSY LEFT COMPLETE Left 2024      Family History   Problem Relation Age of Onset    Hypertension Mother     Mitral valve prolapse Father     Heart disease Father         had heart attack  and surgery Hx of MVP    Stroke Father         while in the hospital for heart surgery    Hypertension Father     Breast cancer Sister 42         age 43    Cancer Sister         breast ca  age 43    Uterine cancer Paternal Grandmother     Cancer Paternal Grandmother         post menopausal  bleeding, was told the cancer was everywhere Diedage 55    Colon cancer Paternal Grandfather          age 65    Cancer Paternal Grandfather         colon CA   age 65    Hypertension Brother       Social History     Tobacco Use    Smoking status: Some Days     Current packs/day: 0.00     Average packs/day: 0.5 packs/day for 45.9 years (23.0 ttl pk-yrs)     Types: Cigarettes     Start date:      Last attempt to quit: 2021     Years since quitting: 3.2     Passive exposure: Never    Smokeless tobacco: Never    Tobacco comments:     I still have some days I have had 1 cigarette   Vaping Use    Vaping status: Never Used   Substance Use Topics    Alcohol use: Not Currently    Drug use: Never      E-Cigarette/Vaping    E-Cigarette Use Never User       E-Cigarette/Vaping Substances    Nicotine No     THC No     CBD No     Flavoring No     Other No     Unknown No       I have reviewed and agree with the history as documented.     Patient is a 59-year-old female history of pulmonary emphysema presenting for evaluation of 3 days of productive cough, shortness of breath, wheezing.  Patient denies fevers, chills, headache, rhinorrhea, sore throat, myalgias.  Patient exposed to a family member with similar symptoms about a week ago.  Patient has been using home nebulizer with intermittent relief of symptoms.  Patient denies chest pain.  Patient denies nausea, vomiting, diarrhea.  Patient continues to eat, drink, urinate, stool normally.        Review of Systems   Constitutional:  Negative for chills, fatigue and fever.   Respiratory:  Positive for cough, shortness of breath and wheezing.    Gastrointestinal:  Negative for diarrhea, nausea and vomiting.   Musculoskeletal:  Negative for arthralgias and myalgias.   Psychiatric/Behavioral:  Negative for confusion.    All other systems reviewed and are negative.          Objective       ED Triage Vitals [25 1327]   Temperature Pulse Blood Pressure Respirations  SpO2 Patient Position - Orthostatic VS   99.5 °F (37.5 °C) 100 124/58 (!) 24 96 % Sitting      Temp Source Heart Rate Source BP Location FiO2 (%) Pain Score    Tympanic Monitor Left arm -- --      Vitals      Date and Time Temp Pulse SpO2 Resp BP Pain Score FACES Pain Rating User   02/11/25 1327 99.5 °F (37.5 °C) 100 96 % 24 124/58 -- -- SW            Physical Exam  Vitals and nursing note reviewed.   Constitutional:       General: She is not in acute distress.     Appearance: Normal appearance. She is not ill-appearing, toxic-appearing or diaphoretic.      Comments: Well-appearing, nontoxic, nondistressed   HENT:      Head: Normocephalic and atraumatic.      Comments: Moist mucous membranes     Right Ear: External ear normal.      Left Ear: External ear normal.   Eyes:      General:         Right eye: No discharge.         Left eye: No discharge.   Cardiovascular:      Comments: Regular rate and rhythm, no murmurs rubs or gallops.  Extremities warm and well-perfused without mottling  Pulmonary:      Effort: No respiratory distress.      Comments: No increased work of breathing.  Speaking in complete sentences.  Diffuse wheezes most pronounced in the lung bases.  Satting 96% on room air indicating adequate oxygenation.  Abdominal:      General: There is no distension.      Comments: Abdomen soft, nontender, nondistended without rigidity, rebound, guarding   Musculoskeletal:         General: No deformity.      Cervical back: Normal range of motion.   Skin:     Findings: No lesion or rash.   Neurological:      Mental Status: She is alert and oriented to person, place, and time. Mental status is at baseline.      Comments: Awake, alert, pleasant, interactive   Psychiatric:         Mood and Affect: Mood and affect normal.         Results Reviewed       Procedure Component Value Units Date/Time    Comprehensive metabolic panel [[id number]] Collected: 02/11/25 1412    Lab Status: Final result Specimen: Blood from Arm, Left  Updated: 02/11/25 1432     Sodium 136 mmol/L      Potassium 3.7 mmol/L      Chloride 103 mmol/L      CO2 23 mmol/L      ANION GAP 10 mmol/L      BUN 10 mg/dL      Creatinine 0.72 mg/dL      Glucose 109 mg/dL      Calcium 9.5 mg/dL      AST 14 U/L      ALT 17 U/L      Alkaline Phosphatase 74 U/L      Total Protein 6.9 g/dL      Albumin 3.8 g/dL      Total Bilirubin 0.26 mg/dL      eGFR 91 ml/min/1.73sq m     Narrative:      National Kidney Disease Foundation guidelines for Chronic Kidney Disease (CKD):     Stage 1 with normal or high GFR (GFR > 90 mL/min/1.73 square meters)    Stage 2 Mild CKD (GFR = 60-89 mL/min/1.73 square meters)    Stage 3A Moderate CKD (GFR = 45-59 mL/min/1.73 square meters)    Stage 3B Moderate CKD (GFR = 30-44 mL/min/1.73 square meters)    Stage 4 Severe CKD (GFR = 15-29 mL/min/1.73 square meters)    Stage 5 End Stage CKD (GFR <15 mL/min/1.73 square meters)  Note: GFR calculation is accurate only with a steady state creatinine    FLU/COVID Rapid Antigen (30 min. TAT) - Preferred screening test in ED [[id number]]  (Normal) Collected: 02/11/25 1412    Lab Status: Final result Specimen: Nares from Nose Updated: 02/11/25 1432     SARS COV Rapid Antigen Negative     Influenza A Rapid Antigen Negative     Influenza B Rapid Antigen Negative    Narrative:      This test has been performed using the [employer] [facility]a 2 FLU+SARS Antigen test under the Emergency Use Authorization (EUA). This test has been validated by the  and verified by the performing laboratory. The Keya uses lateral flow immunofluorescent sandwich assay to detect SARS-COV, Influenza A and Influenza B Antigen.     The Quidel [facility]a 2 SARS Antigen test does not differentiate between SARS-CoV and SARS-CoV-2.     Negative results are presumptive and may be confirmed with a molecular assay, if necessary, for patient management. Negative results do not rule out SARS-CoV-2 or influenza infection and should not be used as the sole  basis for treatment or patient management decisions. A negative test result may occur if the level of antigen in a sample is below the limit of detection of this test.     Positive results are indicative of the presence of viral antigens, but do not rule out bacterial infection or co-infection with other viruses.     All test results should be used as an adjunct to clinical observations and other information available to the provider.    FOR PEDIATRIC PATIENTS - copy/paste COVID Guidelines URL to browser: [email or web address]    CBC and differential [[id number]]  (Abnormal) Collected: 02/11/25 1412    Lab Status: Final result Specimen: Blood from Arm, Left Updated: 02/11/25 1416     WBC 7.52 Thousand/uL      RBC 4.87 Million/uL      Hemoglobin 14.3 g/dL      Hematocrit 43.7 %      MCV 90 fL      MCH 29.4 pg      MCHC 32.7 g/dL      RDW 15.2 %      MPV 11.7 fL      Platelets 193 Thousands/uL      nRBC 0 /100 WBCs      Segmented % 65 %      Immature Grans % 1 %      Lymphocytes % 17 %      Monocytes % 15 %      Eosinophils Relative 1 %      Basophils Relative 1 %      Absolute Neutrophils 4.90 Thousands/µL      Absolute Immature Grans 0.04 Thousand/uL      Absolute Lymphocytes 1.30 Thousands/µL      Absolute Monocytes 1.16 Thousand/µL      Eosinophils Absolute 0.07 Thousand/µL      Basophils Absolute 0.05 Thousands/µL     Procalcitonin [[id number]] Collected: 02/11/25 1412    Lab Status: In process Specimen: Blood from Arm, Left Updated: 02/11/25 1414            XR chest 1 view portable   ED Interpretation by Jonh Carranza MD (02/11 0324)   No acute cardiopulmonary abnormality      Final Interpretation by Leandro Almanza MD (02/11 1504)      No acute cardiopulmonary disease.            Workstation performed: [id number]             Procedures    ED Medication and Procedure Management   Prior to Admission Medications   Prescriptions Last Dose Informant Patient  Reported? Taking?   Budeson-Glycopyrrol-Formoterol (Breztri Aerosphere) 160-9-4.8 MCG/ACT AERO  Self No No   Sig: Inhale 2 puffs 2 (two) times a day Rinse mouth after use.   Triamcinolone Acetonide (Nasacort Allergy 24HR) 55 MCG/ACT nasal spray  Self Yes No   Si sprays by Each Nare route daily   albuterol (Ventolin HFA) 90 mcg/act inhaler  Self No No   Sig: Inhale 2 puffs every 6 (six) hours as needed for wheezing   calcipotriene (DOVONEX) 0.005 % cream  Self No No   Sig: Apply topically 2 (two) times a day To affected areas on the weekends.   clobetasol (TEMOVATE) 0.05 % cream  Self No No   Sig: Apply topically 2 (two) times a day To affected areas Monday through Friday   fexofenadine (ALLEGRA) 180 MG tablet  Self Yes No   Sig: Take 180 mg by mouth daily   ipratropium-albuterol (DUO-NEB) 0.5-2.5 mg/3 mL nebulizer solution  Self Yes No   ipratropium-albuterol (DUO-NEB) 0.5-2.5 mg/3 mL nebulizer solution   No No   Sig: Take 3 mL by nebulization 4 (four) times a day   methylPREDNISolone 4 MG tablet therapy pack   No No   Sig: Use as directed on package   mometasone (NASONEX) 50 mcg/act nasal spray  Self No No   Si sprays into each nostril daily as needed (nasal congestion)   nicotine (NICOTROL) 10 MG inhaler  Self No No   Sig: Use 1 cartridge per hour as needed do not exceed 10 per day   pantoprazole (PROTONIX) 40 mg tablet  Self No No   Sig: Take 1 tablet (40 mg total) by mouth daily   pseudoephedrine (SUDAFED) 30 mg tablet  Self Yes No   Sig: Take 30 mg by mouth every 4 (four) hours as needed for congestion      Facility-Administered Medications: None     Discharge Medication List as of 2025  3:04 PM        START taking these medications    Details   predniSONE 20 mg tablet Take 2 tablets (40 mg total) by mouth daily for 7 days, Starting 2025, Until 2025, Normal           CONTINUE these medications which have NOT CHANGED    Details   albuterol (Ventolin HFA) 90 mcg/act inhaler Inhale 2  puffs every 6 (six) hours as needed for wheezing, Starting Sun 7/21/2024, Normal      Budeson-Glycopyrrol-Formoterol (Breztri Aerosphere) 160-9-4.8 MCG/ACT AERO Inhale 2 puffs 2 (two) times a day Rinse mouth after use., Starting Fri 11/1/2024, Normal      calcipotriene (DOVONEX) 0.005 % cream Apply topically 2 (two) times a day To affected areas on the weekends., Starting Mon 4/29/2024, Normal      clobetasol (TEMOVATE) 0.05 % cream Apply topically 2 (two) times a day To affected areas Monday through Friday, Starting Mon 4/29/2024, Normal      fexofenadine (ALLEGRA) 180 MG tablet Take 180 mg by mouth daily, Historical Med      !! ipratropium-albuterol (DUO-NEB) 0.5-2.5 mg/3 mL nebulizer solution Historical Med      !! ipratropium-albuterol (DUO-NEB) 0.5-2.5 mg/3 mL nebulizer solution Take 3 mL by nebulization 4 (four) times a day, Starting Tue 12/10/2024, Normal      methylPREDNISolone 4 MG tablet therapy pack Use as directed on package, Normal      mometasone (NASONEX) 50 mcg/act nasal spray 2 sprays into each nostril daily as needed (nasal congestion), Starting Wed 6/26/2024, Normal      nicotine (NICOTROL) 10 MG inhaler Use 1 cartridge per hour as needed do not exceed 10 per day, Normal      pantoprazole (PROTONIX) 40 mg tablet Take 1 tablet (40 mg total) by mouth daily, Starting Tue 11/5/2024, Normal      pseudoephedrine (SUDAFED) 30 mg tablet Take 30 mg by mouth every 4 (four) hours as needed for congestion, Historical Med      Triamcinolone Acetonide (Nasacort Allergy 24HR) 55 MCG/ACT nasal spray 2 sprays by Each Nare route daily, Historical Med       !! - Potential duplicate medications found. Please discuss with provider.        No discharge procedures on file.  ED SEPSIS DOCUMENTATION   Time reflects when diagnosis was documented in both MDM as applicable and the Disposition within this note       Time User Action Codes Description Comment    2/11/2025  3:03 PM Jonh Carranza [J44.1] COPD  exacerbation ([facility])     2/11/2025  3:03 PM Jonh Carranza Add [J40] Bronchitis                  Jonh Carranza MD  02/11/25 1539

## 2025-02-19 DIAGNOSIS — K21.9 GASTROESOPHAGEAL REFLUX DISEASE WITHOUT ESOPHAGITIS: ICD-10-CM

## 2025-02-20 RX ORDER — PANTOPRAZOLE SODIUM 40 MG/1
40 TABLET, DELAYED RELEASE ORAL DAILY
Qty: 30 TABLET | Refills: 0 | Status: SHIPPED | OUTPATIENT
Start: 2025-02-20

## 2025-03-18 DIAGNOSIS — K21.9 GASTROESOPHAGEAL REFLUX DISEASE WITHOUT ESOPHAGITIS: ICD-10-CM

## 2025-03-18 RX ORDER — PANTOPRAZOLE SODIUM 40 MG/1
40 TABLET, DELAYED RELEASE ORAL DAILY
Qty: 90 TABLET | Refills: 0 | Status: CANCELLED | OUTPATIENT
Start: 2025-03-18

## 2025-03-18 NOTE — TELEPHONE ENCOUNTER
Message sent via [employer].     I would like a refill of my Rx for Protonix 40mg could it be for 90 days please . [facility] [place]

## 2025-05-19 DIAGNOSIS — K21.9 GASTROESOPHAGEAL REFLUX DISEASE WITHOUT ESOPHAGITIS: ICD-10-CM

## 2025-05-19 DIAGNOSIS — J43.2 CENTRILOBULAR EMPHYSEMA (HCC): ICD-10-CM

## 2025-05-19 DIAGNOSIS — J30.2 SEASONAL ALLERGIC RHINITIS, UNSPECIFIED TRIGGER: ICD-10-CM

## 2025-05-19 RX ORDER — BUDESONIDE, GLYCOPYRROLATE, AND FORMOTEROL FUMARATE 160; 9; 4.8 UG/1; UG/1; UG/1
2 AEROSOL, METERED RESPIRATORY (INHALATION) 2 TIMES DAILY
Qty: 32.1 G | Refills: 0 | Status: SHIPPED | OUTPATIENT
Start: 2025-05-19

## 2025-05-19 RX ORDER — PANTOPRAZOLE SODIUM 40 MG/1
40 TABLET, DELAYED RELEASE ORAL DAILY
Qty: 30 TABLET | Refills: 0 | Status: SHIPPED | OUTPATIENT
Start: 2025-05-19

## 2025-05-19 RX ORDER — MOMETASONE FUROATE MONOHYDRATE 50 UG/1
2 SPRAY, METERED NASAL DAILY PRN
Qty: 51 G | Refills: 0 | Status: SHIPPED | OUTPATIENT
Start: 2025-05-19

## 2025-06-14 DIAGNOSIS — K21.9 GASTROESOPHAGEAL REFLUX DISEASE WITHOUT ESOPHAGITIS: ICD-10-CM

## 2025-06-17 RX ORDER — PANTOPRAZOLE SODIUM 40 MG/1
40 TABLET, DELAYED RELEASE ORAL DAILY
Qty: 30 TABLET | Refills: 0 | Status: SHIPPED | OUTPATIENT
Start: 2025-06-17

## 2025-07-01 ENCOUNTER — OFFICE VISIT (OUTPATIENT)
Dept: FAMILY MEDICINE CLINIC | Facility: CLINIC | Age: 60
End: 2025-07-01
Payer: COMMERCIAL

## 2025-07-01 ENCOUNTER — OFFICE VISIT (OUTPATIENT)
Dept: PULMONOLOGY | Facility: MEDICAL CENTER | Age: 60
End: 2025-07-01
Payer: COMMERCIAL

## 2025-07-01 VITALS
BODY MASS INDEX: 29.58 KG/M2 | TEMPERATURE: 98.1 F | OXYGEN SATURATION: 95 % | HEART RATE: 95 BPM | SYSTOLIC BLOOD PRESSURE: 132 MMHG | WEIGHT: 167 LBS | DIASTOLIC BLOOD PRESSURE: 64 MMHG

## 2025-07-01 VITALS
RESPIRATION RATE: 16 BRPM | OXYGEN SATURATION: 96 % | TEMPERATURE: 97.5 F | BODY MASS INDEX: 29.41 KG/M2 | DIASTOLIC BLOOD PRESSURE: 76 MMHG | HEIGHT: 63 IN | WEIGHT: 166 LBS | HEART RATE: 85 BPM | SYSTOLIC BLOOD PRESSURE: 126 MMHG

## 2025-07-01 DIAGNOSIS — Z00.00 ANNUAL PHYSICAL EXAM: Primary | ICD-10-CM

## 2025-07-01 DIAGNOSIS — Z72.0 TOBACCO USE: ICD-10-CM

## 2025-07-01 DIAGNOSIS — M54.12 CERVICAL RADICULOPATHY: ICD-10-CM

## 2025-07-01 DIAGNOSIS — J44.89 COPD WITH ASTHMA (HCC): Primary | ICD-10-CM

## 2025-07-01 DIAGNOSIS — Z13.6 SCREENING FOR CARDIOVASCULAR CONDITION: ICD-10-CM

## 2025-07-01 DIAGNOSIS — J30.1 SEASONAL ALLERGIC RHINITIS DUE TO POLLEN: ICD-10-CM

## 2025-07-01 DIAGNOSIS — F17.210 CIGARETTE NICOTINE DEPENDENCE WITHOUT COMPLICATION: ICD-10-CM

## 2025-07-01 DIAGNOSIS — R06.2 WHEEZING: ICD-10-CM

## 2025-07-01 PROCEDURE — 99396 PREV VISIT EST AGE 40-64: CPT | Performed by: NURSE PRACTITIONER

## 2025-07-01 PROCEDURE — 99214 OFFICE O/P EST MOD 30 MIN: CPT | Performed by: INTERNAL MEDICINE

## 2025-07-01 PROCEDURE — 95012 NITRIC OXIDE EXP GAS DETER: CPT | Performed by: INTERNAL MEDICINE

## 2025-07-01 RX ORDER — BUDESONIDE, GLYCOPYRROLATE, AND FORMOTEROL FUMARATE 160; 9; 4.8 UG/1; UG/1; UG/1
2 AEROSOL, METERED RESPIRATORY (INHALATION) 2 TIMES DAILY
Qty: 10.7 G | Refills: 11 | Status: SHIPPED | OUTPATIENT
Start: 2025-07-01

## 2025-07-01 NOTE — ASSESSMENT & PLAN NOTE
There was some faint expiratory wheezes bilaterally in lower lobes today.  Exhaled nitric oxide level was less than 5 ppb.  Show states she is doing okay with any shortness of breath.    Orders:    POCT FeNO

## 2025-07-01 NOTE — PATIENT INSTRUCTIONS
"Patient Education     Routine physical for adults   The Basics   Written by the doctors and editors at [facility]   What is a physical? -- A physical is a routine visit, or \"check-up,\" with your doctor. You might also hear it called a \"wellness visit\" or \"preventive visit.\"  During each visit, the doctor will:   Ask about your physical and mental health   Ask about your habits, behaviors, and lifestyle   Do an exam   Give you vaccines if needed   Talk to you about any medicines you take   Give advice about your health   Answer your questions  Getting regular check-ups is an important part of taking care of your health. It can help your doctor find and treat any problems you have. But it's also important for preventing health problems.  A routine physical is different from a \"sick visit.\" A sick visit is when you see a doctor because of a health concern or problem. Since physicals are scheduled ahead of time, you can think about what you want to ask the doctor.  How often should I get a physical? -- It depends on your age and health. In general, for people age 21 years and older:   If you are younger than 50 years, you might be able to get a physical every 3 years.   If you are 50 years or older, your doctor might recommend a physical every year.  If you have an ongoing health condition, like diabetes or high blood pressure, your doctor will probably want to see you more often.  What happens during a physical? -- In general, each visit will include:   Physical exam - The doctor or nurse will check your height, weight, heart rate, and blood pressure. They will also look at your eyes and ears. They will ask about how you are feeling and whether you have any symptoms that bother you.   Medicines - It's a good idea to bring a list of all the medicines you take to each doctor visit. Your doctor will talk to you about your medicines and answer any questions. Tell them if you are having any side effects that bother you. You " "should also tell them if you are having trouble paying for any of your medicines.   Habits and behaviors - This includes:   Your diet   Your exercise habits   Whether you smoke, drink alcohol, or use drugs   Whether you are sexually active   Whether you feel safe at home  Your doctor will talk to you about things you can do to improve your health and lower your risk of health problems. They will also offer help and support. For example, if you want to quit smoking, they can give you advice and might prescribe medicines. If you want to improve your diet or get more physical activity, they can help you with this, too.   Lab tests, if needed - The tests you get will depend on your age and situation. For example, your doctor might want to check your:   Cholesterol   Blood sugar   Iron level   Vaccines - The recommended vaccines will depend on your age, health, and what vaccines you already had. Vaccines are very important because they can prevent certain serious or deadly infections.   Discussion of screening - \"Screening\" means checking for diseases or other health problems before they cause symptoms. Your doctor can recommend screening based on your age, risk, and preferences. This might include tests to check for:   Cancer, such as breast, prostate, cervical, ovarian, colorectal, prostate, lung, or skin cancer   Sexually transmitted infections, such as chlamydia and gonorrhea   Mental health conditions like depression and anxiety  Your doctor will talk to you about the different types of screening tests. They can help you decide which screenings to have. They can also explain what the results might mean.   Answering questions - The physical is a good time to ask the doctor or nurse questions about your health. If needed, they can refer you to other doctors or specialists, too.  Adults older than 65 years often need other care, too. As you get older, your doctor will talk to you about:   How to prevent falling at " home   Hearing or vision tests   Memory testing   How to take your medicines safely   Making sure that you have the help and support you need at home  All topics are updated as new evidence becomes available and our peer review process is complete.  This topic retrieved from [employer] on: May 02, 2024.  Topic 324730 Version 1.0  Release: 32.4.3 - C32.122  © 2024 UpToDate, Inc. and/or its affiliates. All rights reserved.  Consumer Information Use and Disclaimer   Disclaimer: This generalized information is a limited summary of diagnosis, treatment, and/or medication information. It is not meant to be comprehensive and should be used as a tool to help the user understand and/or assess potential diagnostic and treatment options. It does NOT include all information about conditions, treatments, medications, side effects, or risks that may apply to a specific patient. It is not intended to be medical advice or a substitute for the medical advice, diagnosis, or treatment of a health care provider based on the health care provider's examination and assessment of a patient's specific and unique circumstances. Patients must speak with a health care provider for complete information about their health, medical questions, and treatment options, including any risks or benefits regarding use of medications. This information does not endorse any treatments or medications as safe, effective, or approved for treating a specific patient. UpToDate, Inc. and its affiliates disclaim any warranty or liability relating to this information or the use thereof.The use of this information is governed by the Terms of Use, available at https://www.woltersSportboomuwer.com/en/know/clinical-effectiveness-terms. 2024© UpToDate, Inc. and its affiliates and/or licensors. All rights reserved.  Copyright   © 2024 UpToDate, Inc. and/or its affiliates. All rights reserved.

## 2025-07-01 NOTE — ASSESSMENT & PLAN NOTE
She does take Allegra 180 mg daily as needed and will use steroid nasal spray also when needed.  I did offer to order serum [place] allergy panel but she declined at this time.  Right now only having some mild nasal congestion and postnasal drip.  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  Recent CBC done in February showed no significant eosinophilia.  Eosinophil count was 1%

## 2025-07-01 NOTE — ASSESSMENT & PLAN NOTE
Mild COPD with probable asthma as well.  She does have seasonal allergies.  Did have some wheezing today.  Exhaled nitric oxide Fe NO level today was normal at less than 5 ppb.    Will continue Breztri 2 puffs once or twice a day as needed.    Orders:    Budeson-Glycopyrrol-Formoterol (Breztri Aerosphere) 160-9-4.8 MCG/ACT AERO; Inhale 2 puffs in the morning and 2 puffs before bedtime. Rinse mouth after use.

## 2025-07-01 NOTE — PROGRESS NOTES
Follow-up  Visit - Pulmonary Medicine   Name: Mirta Black      : 1965      MRN: [id number]  Encounter Provider: Philip Patel DO  Encounter Date: 2025   Encounter department: [facility]  :  Assessment & Plan  COPD with asthma (HCC)  Mild COPD with probable asthma as well.  She does have seasonal allergies.  Did have some wheezing today.  Exhaled nitric oxide Fe NO level today was normal at less than 5 ppb.    Will continue Breztri 2 puffs once or twice a day as needed.    Orders:    Budeson-Glycopyrrol-Formoterol (Breztri Aerosphere) 160-9-4.8 MCG/ACT AERO; Inhale 2 puffs in the morning and 2 puffs before bedtime. Rinse mouth after use.    Wheezing  There was some faint expiratory wheezes bilaterally in lower lobes today.  Exhaled nitric oxide level was less than 5 ppb.  Show states she is doing okay with any shortness of breath.    Orders:    POCT FeNO    Seasonal allergic rhinitis due to pollen  She does take Allegra 180 mg daily as needed and will use steroid nasal spray also when needed.  I did offer to order serum [place] allergy panel but she declined at this time.  Right now only having some mild nasal congestion and postnasal drip.  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  Recent CBC done in February showed no significant eosinophilia.  Eosinophil count was 1%         Cigarette nicotine dependence without complication  She has been smoking least 1/2 pack of cigarettes per day since age 14 and has been smoking for at least 40 years.  I did encourage her to try to quit smoking.  A lung cancer screening CT scan of chest have been ordered by me a couple years ago during her last visit.  This was reordered by her primary provider and I encouraged her to get this done sometime this year.           No follow-ups on file.    History of Present Illness   Mirta Black is a 60 y.o. female who presents for follow-up visit today.  She states she  gets some nasal congestion postnasal drainage.  Right now not have any problem with her breathing.  She does feel better with use of Breztri which has been taking 2 puffs twice a day but sometimes will forget to take it or will take it once a day.  She is still smoking about a half a half a pack of cigarettes per day.  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  She has no history of asthma.  Does take Allegra 180 mg daily and will use steroid spray when needed..  She does have history of smoking 1 pack cigarettes per day and started smoking at age 14.  She did reduce her cigarette smoking to 1/2 pack of cigarettes per day.  Spirometry done last office visit showed lung volumes to be normal with FEV1 of 2.0 L or 80% of predicted obstructive ratio 72%.  She did have a complete PFT done at outside institution before that showed mild airflow obstruction and mild decrease in diffusion capacity measurement.  That was back in November 2021.  She does have some.  Cough and wheeze but is not having shortness of breath with her activity.  She does work online from home.    Jalil was seen in ER in February of this year for shortness of breath and productive cough.  Also had low-grade fever.  She was noted to have diffuse wheezing.  Nasal swab at that time for COVID and influenza was negative.  Chest x-ray showed no lung infiltrates.  She was given nebulizer treatment with albuterol and ipratropium and discharged home with prednisone 40 mg daily for 5 days.  Her symptoms did resolve.  White blood cell count during the visit was 7.52 with hemoglobin 14.3 and platelet count 193 only 1% eosinophils.    She saw her primary provider earlier today and order was placed for lung cancer screening CT of chest.      Review of Systems   Constitutional:  Positive for appetite change. Negative for fever.   HENT:  Positive for postnasal drip. Negative for ear pain, rhinorrhea, sneezing, sore throat and trouble  swallowing.    Eyes:  Negative for redness.   Respiratory:  Positive for cough and wheezing.    Cardiovascular:  Negative for chest pain.   Gastrointestinal:  Negative for abdominal distention.   Endocrine: Negative for polydipsia and polyphagia.   Genitourinary:  Negative for dysuria.   Musculoskeletal:  Negative for myalgias.   Neurological:  Positive for headaches.   Psychiatric/Behavioral:  Negative for decreased concentration.        Aside from what is mentioned in the HPI, ROS is otherwise negative         Medical History Reviewed by provider this encounter:  Tobacco  Allergies  Meds  Problems  Med Hx  Surg Hx  Fam Hx     .    Objective   /64   Pulse 95   Temp 98.1 °F (36.7 °C)   Wt 75.8 kg (167 lb)   SpO2 95%   BMI 29.58 kg/m²     Physical Exam  Vitals reviewed.   Constitutional:       General: She is not in acute distress.     Appearance: Normal appearance. She is well-developed.   HENT:      Head: Normocephalic.      Right Ear: External ear normal.      Left Ear: External ear normal.      Nose: Nose normal.      Mouth/Throat:      Mouth: Mucous membranes are moist.      Pharynx: Oropharynx is clear. No oropharyngeal exudate.     Eyes:      Conjunctiva/sclera: Conjunctivae normal.      Pupils: Pupils are equal, round, and reactive to light.       Cardiovascular:      Rate and Rhythm: Normal rate and regular rhythm.      Heart sounds: Normal heart sounds.   Pulmonary:      Effort: Pulmonary effort is normal.      Comments: There are some thin expiratory wheezes in both lower lobes.  No crackles or rhonchi.  Abdominal:      General: There is no distension.      Palpations: Abdomen is soft.      Tenderness: There is no abdominal tenderness.     Musculoskeletal:      Cervical back: Neck supple.      Comments: Edema, cyanosis or clubbing   Lymphadenopathy:      Cervical: No cervical adenopathy.     Skin:     General: Skin is warm and dry.     Neurological:      General: No focal deficit present.       Mental Status: She is alert and oriented to person, place, and time.     Psychiatric:         Mood and Affect: Mood normal.         Behavior: Behavior normal.         Thought Content: Thought content normal.               Radiology results:  Radiology Results Review: I personally reviewed the following image studies in PACS and associated radiology reports: chest xray. My interpretation of the radiology images/reports is: Portable chest x-ray done on 2/11/2025 was reviewed.  Heart size is normal and lung fields are clear      Other studies:  Exhaled nitric oxide Fe NO level was less than 5 ppb which is normal        Philip Patel,       Answers submitted by the patient for this visit:  Pulmonology Questionnaire (Submitted on 6/24/2025)  Chief Complaint: Primary symptoms  Do you have difficulty breathing?: Yes  Chronicity: chronic  When did you first notice your symptoms?: more than 1 month ago  How often do your symptoms occur?: constantly  Since you first noticed this problem, how has it changed?: unchanged  Do you have shortness of breath that occurs with effort or exertion?: Yes  Do you have ear congestion?: Yes  Do you have heartburn?: Yes  Do you have fatigue?: Yes  Do you have nasal congestion?: Yes  Do you have shortness of breath when lying flat?: No  Do you have shortness of breath when you wake up?: No  Do you have sweats?: Yes  Have you experienced weight loss?: No  Which of the following makes your symptoms worse?: any activity, change in weather, climbing stairs, strenuous activity  Which of the following makes your symptoms better?: cold air, steroid inhaler  Risk factors for lung disease: smoking/tobacco exposure

## 2025-07-01 NOTE — PROGRESS NOTES
Adult Annual Physical  Name: Mirta Black      : 1965      MRN: [id number]  Encounter Provider: REGI Johnson  Encounter Date: 2025   Encounter department: [facility]    :  Assessment & Plan  Annual physical exam         Tobacco use    Orders:    CT Lung Screening Program; Future    Cervical radiculopathy  PT ordered.  Consider MRI once she has completed 4 to 6 weeks  Orders:    Ambulatory Referral to Physical Therapy; Future    Screening for cardiovascular condition    Orders:    VAS screening; Future        Preventive Screenings:  - Diabetes Screening: screening up-to-date  - Cholesterol Screening: orders placed   - Hepatitis C screening: screening up-to-date   - Cervical cancer screening: screening up-to-date   - Breast cancer screening: screening up-to-date   - Colon cancer screening: screening up-to-date     Immunizations:  - Immunizations due: Prevnar 20, Tdap and Zoster (Shingrix)  - The patient declines recommended vaccines currently despite my recommendations      Counseling/Anticipatory Guidance:    - Diet: discussed recommendations for a healthy/well-balanced diet.   - Exercise: the importance of regular exercise/physical activity was discussed. Recommend exercise 3-5 times per week for at least 30 minutes.          History of Present Illness     Adult Annual Physical:  Patient presents for annual physical. Here today for CPE.  Follows with pulmonology for her COPD.  She is still smoking.  Complains of chronic neck pain as well as numbness and parts of both of her hands.  This sometimes makes it difficult for her to sleep at nighttime.     Diet and Physical Activity:  - Diet/Nutrition: no special diet.  - Exercise: no formal exercise.    Depression Screening:  - PHQ-2 Score: 0    General Health:  - Sleep: sleeps well and 4-6 hours of sleep on average.  - Hearing: normal hearing bilateral ears.  - Vision: no vision problems, wears glasses and most recent eye exam < 1  "year ago.  - Dental: no dental visits for > 1 year, brushes teeth twice daily and floss regularly.    /GYN Health:  - Follows with GYN: yes.   - Menopause: postmenopausal.   - History of STDs: no  - Contraception: menopause.      Advanced Care Planning:  - Has an advanced directive?: no    - Has a durable medical POA?: no    I discussed with her that she is a candidate for lung cancer CT screening.     The following Shared Decision-Making points were covered:  Benefits of screening were discussed, including the rates of reduction in death from lung cancer and other causes.  Harms of screening were reviewed, including false positive tests, radiation exposure levels, risks of invasive procedures, risks of complications of screening, and risk of overdiagnosis.  I counseled on the importance of adherence to annual lung cancer LDCT screening, impact of co-morbidities, and ability or willingness to undergo diagnosis and treatment.  I counseled on the importance of maintaining abstinence as a former smoker or was counseled on the importance of smoking cessation if a current smoker    Review of Eligibility Criteria: She meets all of the criteria for Lung Cancer Screening.   She is 60 y.o.   She has 20 pack year tobacco history and is a current smoker or has quit within the past 15 years  She presents no signs or symptoms of lung cancer    After discussion, the patient decided to elect lung cancer screening.  Review of Systems   Constitutional: Negative.    Respiratory: Negative.     Cardiovascular: Negative.    Gastrointestinal: Negative.    Genitourinary: Negative.    Musculoskeletal:  Positive for neck pain.   Neurological: Negative.    Psychiatric/Behavioral: Negative.       Medications Ordered Prior to Encounter[1]   Social History[2]    Objective   /76   Pulse 85   Temp 97.5 °F (36.4 °C)   Resp 16   Ht 5' 3\" (1.6 m)   Wt 75.3 kg (166 lb)   SpO2 96%   BMI 29.41 kg/m²     Physical Exam  Vitals and nursing " note reviewed.   Constitutional:       Appearance: Normal appearance. She is well-developed.   HENT:      Head: Normocephalic and atraumatic.      Right Ear: Tympanic membrane and external ear normal.      Left Ear: Tympanic membrane and external ear normal.      Nose: Nose normal.      Mouth/Throat:      Pharynx: Oropharynx is clear.     Eyes:      Extraocular Movements: Extraocular movements intact.      Conjunctiva/sclera: Conjunctivae normal.      Pupils: Pupils are equal, round, and reactive to light.     Neck:      Thyroid: No thyromegaly.      Vascular: No carotid bruit.     Cardiovascular:      Rate and Rhythm: Normal rate and regular rhythm.      Pulses: Normal pulses.      Heart sounds: Normal heart sounds. No murmur heard.  Pulmonary:      Effort: Pulmonary effort is normal.      Breath sounds: Normal breath sounds.   Abdominal:      General: Bowel sounds are normal. There is no distension.      Palpations: Abdomen is soft.      Tenderness: There is no abdominal tenderness.     Musculoskeletal:         General: No deformity. Normal range of motion.      Cervical back: Normal range of motion and neck supple.   Lymphadenopathy:      Cervical: No cervical adenopathy.     Skin:     General: Skin is warm and dry.      Capillary Refill: Capillary refill takes less than 2 seconds.     Neurological:      Mental Status: She is alert.      Sensory: No sensory deficit.      Motor: No abnormal muscle tone.      Coordination: Coordination normal.      Deep Tendon Reflexes: Reflexes normal.     Psychiatric:         Mood and Affect: Mood normal.         Behavior: Behavior normal.                [1]   Current Outpatient Medications on File Prior to Visit   Medication Sig Dispense Refill    albuterol (Ventolin HFA) 90 mcg/act inhaler Inhale 2 puffs every 6 (six) hours as needed for wheezing 54 g 0    Budeson-Glycopyrrol-Formoterol (Breztri Aerosphere) 160-9-4.8 MCG/ACT AERO Inhale 2 puffs in the morning and 2 puffs before  bedtime. Rinse mouth after use. 32.1 g 0    calcipotriene (DOVONEX) 0.005 % cream Apply topically 2 (two) times a day To affected areas on the weekends. 60 g 5    clobetasol (TEMOVATE) 0.05 % cream Apply topically 2 (two) times a day To affected areas Monday through Friday 60 g 5    fexofenadine (ALLEGRA) 180 MG tablet Take 180 mg by mouth in the morning.      ipratropium-albuterol (DUO-NEB) 0.5-2.5 mg/3 mL nebulizer solution       ipratropium-albuterol (DUO-NEB) 0.5-2.5 mg/3 mL nebulizer solution Take 3 mL by nebulization 4 (four) times a day 360 mL 0    mometasone (NASONEX) 50 mcg/act nasal spray 2 sprays into each nostril daily as needed (nasal congestion) 51 g 0    nicotine (NICOTROL) 10 MG inhaler Use 1 cartridge per hour as needed do not exceed 10 per day 168 each 3    pantoprazole (PROTONIX) 40 mg tablet take 1 tablet by mouth once daily 30 tablet 0    pseudoephedrine (SUDAFED) 30 mg tablet Take 30 mg by mouth every 4 (four) hours as needed for congestion      Triamcinolone Acetonide (Nasacort Allergy 24HR) 55 MCG/ACT nasal spray 2 sprays by Each Nare route in the morning.      [DISCONTINUED] methylPREDNISolone 4 MG tablet therapy pack Use as directed on package 21 each 0     No current facility-administered medications on file prior to visit.   [2]   Social History  Tobacco Use    Smoking status: Some Days     Current packs/day: 0.00     Average packs/day: 0.5 packs/day for 45.9 years (23.0 ttl pk-yrs)     Types: Cigarettes     Start date: 1976     Last attempt to quit: 12/2021     Years since quitting: 3.5     Passive exposure: Never    Smokeless tobacco: Never    Tobacco comments:     I still have some days I have had 1 cigarette   Vaping Use    Vaping status: Never Used   Substance and Sexual Activity    Alcohol use: Not Currently    Drug use: Never    Sexual activity: Not Currently     Partners: Male     Birth control/protection: Post-menopausal, Female Sterilization

## 2025-07-01 NOTE — ASSESSMENT & PLAN NOTE
She has been smoking least 1/2 pack of cigarettes per day since age 14 and has been smoking for at least 40 years.  I did encourage her to try to quit smoking.  A lung cancer screening CT scan of chest have been ordered by me a couple years ago during her last visit.  This was reordered by her primary provider and I encouraged her to get this done sometime this year.

## 2025-07-01 NOTE — PATIENT INSTRUCTIONS
Ipratropium albuterol continue Breztri 2 puffs once or twice a day depending on how you are doing I sent prescription to [facility] pharmacy and use coupon for hopefully $0    Call if you start having any problem with your breathing and if you need prednisone    Schedule your lung cancer screening CT and ask for a Saturday

## 2025-07-16 DIAGNOSIS — K21.9 GASTROESOPHAGEAL REFLUX DISEASE WITHOUT ESOPHAGITIS: ICD-10-CM

## 2025-07-17 RX ORDER — PANTOPRAZOLE SODIUM 40 MG/1
40 TABLET, DELAYED RELEASE ORAL DAILY
Qty: 30 TABLET | Refills: 5 | Status: SHIPPED | OUTPATIENT
Start: 2025-07-17

## 2025-08-05 ENCOUNTER — ANNUAL EXAM (OUTPATIENT)
Dept: OBGYN CLINIC | Facility: CLINIC | Age: 60
End: 2025-08-05
Payer: COMMERCIAL

## 2025-08-05 VITALS
SYSTOLIC BLOOD PRESSURE: 122 MMHG | HEIGHT: 63 IN | WEIGHT: 168 LBS | BODY MASS INDEX: 29.77 KG/M2 | DIASTOLIC BLOOD PRESSURE: 78 MMHG

## 2025-08-05 DIAGNOSIS — Z12.31 SCREENING MAMMOGRAM FOR BREAST CANCER: ICD-10-CM

## 2025-08-05 DIAGNOSIS — L02.214 ABSCESS OF RIGHT GROIN: ICD-10-CM

## 2025-08-05 DIAGNOSIS — B37.2 CUTANEOUS CANDIDIASIS: ICD-10-CM

## 2025-08-05 DIAGNOSIS — Z01.419 WELL WOMAN EXAM WITH ROUTINE GYNECOLOGICAL EXAM: Primary | ICD-10-CM

## 2025-08-05 PROCEDURE — S0612 ANNUAL GYNECOLOGICAL EXAMINA: HCPCS
